# Patient Record
Sex: MALE | Race: WHITE | NOT HISPANIC OR LATINO | Employment: OTHER | URBAN - METROPOLITAN AREA
[De-identification: names, ages, dates, MRNs, and addresses within clinical notes are randomized per-mention and may not be internally consistent; named-entity substitution may affect disease eponyms.]

---

## 2021-03-03 ENCOUNTER — IMMUNIZATION (OUTPATIENT)
Dept: VACCINE CLINIC | Facility: HOSPITAL | Age: 66
End: 2021-03-03

## 2021-03-03 PROCEDURE — 0001A: CPT | Performed by: INTERNAL MEDICINE

## 2021-03-03 PROCEDURE — 91300 HC SARSCOV02 VAC 30MCG/0.3ML IM: CPT | Performed by: INTERNAL MEDICINE

## 2021-03-24 ENCOUNTER — IMMUNIZATION (OUTPATIENT)
Dept: VACCINE CLINIC | Facility: HOSPITAL | Age: 66
End: 2021-03-24

## 2021-03-24 PROCEDURE — 91300 HC SARSCOV02 VAC 30MCG/0.3ML IM: CPT | Performed by: INTERNAL MEDICINE

## 2021-03-24 PROCEDURE — 0002A: CPT | Performed by: INTERNAL MEDICINE

## 2021-07-09 ENCOUNTER — CONSULT (OUTPATIENT)
Dept: ONCOLOGY | Facility: CLINIC | Age: 66
End: 2021-07-09

## 2021-07-09 ENCOUNTER — LAB (OUTPATIENT)
Dept: LAB | Facility: HOSPITAL | Age: 66
End: 2021-07-09

## 2021-07-09 ENCOUNTER — TELEPHONE (OUTPATIENT)
Dept: ONCOLOGY | Facility: CLINIC | Age: 66
End: 2021-07-09

## 2021-07-09 VITALS
HEART RATE: 104 BPM | RESPIRATION RATE: 16 BRPM | HEIGHT: 70 IN | DIASTOLIC BLOOD PRESSURE: 77 MMHG | WEIGHT: 206.1 LBS | BODY MASS INDEX: 29.51 KG/M2 | TEMPERATURE: 97.3 F | OXYGEN SATURATION: 97 % | SYSTOLIC BLOOD PRESSURE: 125 MMHG

## 2021-07-09 DIAGNOSIS — D70.8 OTHER NEUTROPENIA (HCC): ICD-10-CM

## 2021-07-09 DIAGNOSIS — D70.8 OTHER NEUTROPENIA (HCC): Primary | ICD-10-CM

## 2021-07-09 PROBLEM — D70.9 NEUTROPENIA: Status: ACTIVE | Noted: 2021-07-09

## 2021-07-09 LAB
25(OH)D3 SERPL-MCNC: 19.5 NG/ML (ref 30–100)
ALBUMIN SERPL-MCNC: 4.2 G/DL (ref 3.5–5.2)
ALBUMIN/GLOB SERPL: 1.6 G/DL
ALP SERPL-CCNC: 69 U/L (ref 39–117)
ALT SERPL W P-5'-P-CCNC: 15 U/L (ref 1–41)
ANION GAP SERPL CALCULATED.3IONS-SCNC: 13 MMOL/L (ref 5–15)
AST SERPL-CCNC: 20 U/L (ref 1–40)
BASOPHILS # BLD AUTO: 0.01 10*3/MM3 (ref 0–0.2)
BASOPHILS NFR BLD AUTO: 0.8 % (ref 0–1.5)
BILIRUB SERPL-MCNC: 1.3 MG/DL (ref 0–1.2)
BUN SERPL-MCNC: 13 MG/DL (ref 8–23)
BUN/CREAT SERPL: 14.4 (ref 7–25)
CALCIUM SPEC-SCNC: 9.3 MG/DL (ref 8.6–10.5)
CHLORIDE SERPL-SCNC: 105 MMOL/L (ref 98–107)
CHROMATIN AB SERPL-ACNC: <10 IU/ML (ref 0–14)
CO2 SERPL-SCNC: 23 MMOL/L (ref 22–29)
CREAT SERPL-MCNC: 0.9 MG/DL (ref 0.76–1.27)
CYTOLOGIST CVX/VAG CYTO: NORMAL
DEPRECATED RDW RBC AUTO: 44.5 FL (ref 37–54)
EOSINOPHIL # BLD AUTO: 0.04 10*3/MM3 (ref 0–0.4)
EOSINOPHIL NFR BLD AUTO: 3.2 % (ref 0.3–6.2)
ERYTHROCYTE [DISTWIDTH] IN BLOOD BY AUTOMATED COUNT: 13.3 % (ref 12.3–15.4)
FOLATE SERPL-MCNC: >20 NG/ML (ref 4.78–24.2)
GFR SERPL CREATININE-BSD FRML MDRD: 84 ML/MIN/1.73
GLOBULIN UR ELPH-MCNC: 2.6 GM/DL
GLUCOSE SERPL-MCNC: 125 MG/DL (ref 65–99)
HCT VFR BLD AUTO: 42.3 % (ref 37.5–51)
HGB BLD-MCNC: 14.4 G/DL (ref 13–17.7)
HIV1+2 AB SER QL: NORMAL
IMM GRANULOCYTES # BLD AUTO: 0.01 10*3/MM3 (ref 0–0.05)
IMM GRANULOCYTES NFR BLD AUTO: 0.8 % (ref 0–0.5)
LDH SERPL-CCNC: 213 U/L (ref 135–225)
LYMPHOCYTES # BLD AUTO: 0.39 10*3/MM3 (ref 0.7–3.1)
LYMPHOCYTES NFR BLD AUTO: 31 % (ref 19.6–45.3)
MCH RBC QN AUTO: 31 PG (ref 26.6–33)
MCHC RBC AUTO-ENTMCNC: 34 G/DL (ref 31.5–35.7)
MCV RBC AUTO: 91.2 FL (ref 79–97)
MONOCYTES # BLD AUTO: 0.05 10*3/MM3 (ref 0.1–0.9)
MONOCYTES NFR BLD AUTO: 4 % (ref 5–12)
NEUTROPHILS NFR BLD AUTO: 0.76 10*3/MM3 (ref 1.7–7)
NEUTROPHILS NFR BLD AUTO: 60.2 % (ref 42.7–76)
NRBC BLD AUTO-RTO: 0 /100 WBC (ref 0–0.2)
PATH INTERP BLD-IMP: NORMAL
PLATELET # BLD AUTO: 246 10*3/MM3 (ref 140–450)
PMV BLD AUTO: 10.2 FL (ref 6–12)
POTASSIUM SERPL-SCNC: 4 MMOL/L (ref 3.5–5.2)
PROT SERPL-MCNC: 6.8 G/DL (ref 6–8.5)
RBC # BLD AUTO: 4.64 10*6/MM3 (ref 4.14–5.8)
SODIUM SERPL-SCNC: 141 MMOL/L (ref 136–145)
VIT B12 BLD-MCNC: 474 PG/ML (ref 211–946)
WBC # BLD AUTO: 1.26 10*3/MM3 (ref 3.4–10.8)

## 2021-07-09 PROCEDURE — 86038 ANTINUCLEAR ANTIBODIES: CPT

## 2021-07-09 PROCEDURE — 82306 VITAMIN D 25 HYDROXY: CPT

## 2021-07-09 PROCEDURE — 85025 COMPLETE CBC W/AUTO DIFF WBC: CPT

## 2021-07-09 PROCEDURE — 82607 VITAMIN B-12: CPT

## 2021-07-09 PROCEDURE — 84630 ASSAY OF ZINC: CPT

## 2021-07-09 PROCEDURE — 83615 LACTATE (LD) (LDH) ENZYME: CPT

## 2021-07-09 PROCEDURE — G0432 EIA HIV-1/HIV-2 SCREEN: HCPCS

## 2021-07-09 PROCEDURE — 82525 ASSAY OF COPPER: CPT

## 2021-07-09 PROCEDURE — 85060 BLOOD SMEAR INTERPRETATION: CPT

## 2021-07-09 PROCEDURE — 82746 ASSAY OF FOLIC ACID SERUM: CPT

## 2021-07-09 PROCEDURE — 86431 RHEUMATOID FACTOR QUANT: CPT

## 2021-07-09 PROCEDURE — 36415 COLL VENOUS BLD VENIPUNCTURE: CPT

## 2021-07-09 PROCEDURE — 99204 OFFICE O/P NEW MOD 45 MIN: CPT | Performed by: INTERNAL MEDICINE

## 2021-07-09 PROCEDURE — 80053 COMPREHEN METABOLIC PANEL: CPT

## 2021-07-09 RX ORDER — ATORVASTATIN CALCIUM 10 MG/1
TABLET, FILM COATED ORAL
COMMUNITY
Start: 2021-07-06 | End: 2022-05-17

## 2021-07-09 NOTE — TELEPHONE ENCOUNTER
Critical Test Results      MD: Dr. Alcantara  Date: 7/9/2021     Critical test result: WBC 1.26    Time results received: 0917

## 2021-07-09 NOTE — TELEPHONE ENCOUNTER
Name of Physician notified: Quinton Hightower Physician Notified: 10:02      []  Orders received    []  Protocol/Standing orders followed    [x]  No new orders

## 2021-07-09 NOTE — PROGRESS NOTES
New Patient Office Visit      Date: 2021     Patient Name: Jose Wells  MRN: [id number]  : 1955  Referring Physician: Kiya Grossman    Chief Complaint: Establish care for neutropenia    History of Present Illness: Jose Wells is a pleasant 65 y.o. male past medical history of hyperlipidemia and hypertension who presents today for evaluation of neutropenia. The patient is accompanied by their wife who contributes to the history of their care.  Patient states that over the past couple weeks he has been feeling more fatigued.  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  Denies any unexplained fevers, infections, night sweats, weight loss.  He was seen by his PCP who checked a CBC which was notable for WBC of 1.1 with an ANC of 0.6.  Hemoglobin and platelet count were within normal limits.  He denies any family history of any leukemias.  Does note that him and his wife recently traveled to [employer].  States that during the trip he was taking excessive dose of a vitamin C multivitamin.  Has continued to take a multivitamin since being home.  Per chart review he had a WBC of 4.8 in 2019 with an ANC of 3400 at that time.  He otherwise feels well today and is compliant with his home medications    Oncology History:    Oncology/Hematology History    No history exists.       Subjective      Review of Systems:     Constitutional: Negative for fevers, chills, or weight loss  Eyes: Negative for blurred vision or discharge         Ear/Nose/Throat: Negative for difficulty swallowing, sore throat, LAD                                                       Respiratory: Negative for cough, SOA, wheezing                                                                                        Cardiovascular: Negative for chest pain or palpitations                                                                  Gastrointestinal:  Negative for nausea, vomiting or diarrhea                                                                     Genitourinary: Negative for dysuria or hematuria                                                                                           Musculoskeletal: Negative for any joint pains or muscle aches                                                                        Neurologic: Negative for any weakness, headaches, dizziness                                                                         Hematologic: Negative for any easy bleeding or bruising                                                                                   Psychiatric: Negative for anxiety or depression                             Past Medical History:   Past Medical History:   Diagnosis Date   • Hypertension    • Skin cancer        Past Surgical History:   Past Surgical History:   Procedure Laterality Date   • CHOLECYSTECTOMY     • HAND SURGERY Right    • KNEE SURGERY Right    • KNEE SURGERY Left        Family History:   Family History   Problem Relation Age of Onset   • Alzheimer's disease Mother    • Heart disease Father    • Stroke Brother        Social History:   Social History     Socioeconomic History   • Marital status:      Spouse name: Not on file   • Number of children: Not on file   • Years of education: Not on file   • Highest education level: Not on file   Tobacco Use   • Smoking status: Former Smoker     Packs/day: 0.50     Years: 20.00     Pack years: 10.00     Types: Cigarettes     Quit date:      Years since quittin.5   • Smokeless tobacco: Never Used   • Tobacco comment: off and on for 20 years   Substance and Sexual Activity   • Alcohol use: Yes     Comment: 2-4 drinks per week   • Drug use: Never       Medications:     Current Outpatient Medications:   •  atorvastatin (LIPITOR) 10 MG tablet, , Disp: , Rfl:     Allergies:   No Known Allergies    Objective     Physical Exam:  Vital  "Signs:   Vitals:    07/09/21 0830   BP: 125/77   Pulse: 104   Resp: 16   Temp: 97.3 °F (36.3 °C)   TempSrc: Temporal   SpO2: 97%   Weight: 93.5 kg (206 lb 1.6 oz)   Height: 177.8 cm (70\")   PainSc: 0-No pain     Pain Score    07/09/21 0830   PainSc: 0-No pain     ECOG Performance Status: 0 - Asymptomatic    Constitutional: NAD, ECOG 0  Eyes: PERRLA, scleral anicteric  ENT: No LAD, no thyromegaly  Respiratory: CTAB, no wheezing, rales, rhonchi  Cardiovascular: RRR, no murmurs, pulses 2+ bilaterally  Abdomen: soft, NT/ND, no HSM  Musculoskeletal: strength 5/5 bilaterally, no c/c/e  Neurologic: A&O x 3, CN II-XII intact grossly  Psych: mood and affect congruent, no SI or HI    Results Review:   No visits with results within 2 Week(s) from this visit.   Latest known visit with results is:   No results found for any previous visit.       No results found.    Assessment / Plan      Assessment/Plan:   1. Other neutropenia (CMS/HCC) (Primary)  -Unclear etiology at this time  -Most recent WBC 1.1 with an ANC of 600.  Per chart review is WBC was 4.8 in April 2019 with an ANC of 3400  -Platelet count and Hgb within normal limits  -As his only constitutional symptom is fatigue, will hold off on bone marrow biopsy at this time unless no other secondary causes found  -Advised patient to notify the clinic for any unexplained fevers  -     CBC & Differential; Future  -     Comprehensive Metabolic Panel; Future  -     HIV-1 / O / 2 Ag / Antibody 4th Generation; Future  -     Lactate Dehydrogenase; Future  -     Folate; Future  -     Vitamin B12; Future  -     Peripheral Blood Smear; Future  -     Flow Cytometry, Blood; Future  -     Copper, Serum; Future  -     Zinc; Future  -     Nuclear Antigen Antibody, IFA; Future  -     Rheumatoid Factor; Future  -     Vitamin D 25 Hydroxy; Future           Follow Up:   Follow-up in 2 weeks     Judah Alcantara MD  Hematology and Oncology     Please note that portions of this note may have " been completed with a voice recognition program. Efforts were made to edit the dictations, but occasionally words are mistranscribed.

## 2021-07-12 LAB
ANA TITR SER IF: NEGATIVE {TITER}
REF LAB TEST METHOD: NORMAL

## 2021-07-13 LAB
COPPER SERPL-MCNC: 115 UG/DL (ref 69–132)
ZINC SERPL-MCNC: 75 UG/DL (ref 44–115)

## 2021-07-19 ENCOUNTER — OFFICE VISIT (OUTPATIENT)
Dept: ONCOLOGY | Facility: CLINIC | Age: 66
End: 2021-07-19

## 2021-07-19 VITALS
WEIGHT: 208.2 LBS | SYSTOLIC BLOOD PRESSURE: 131 MMHG | RESPIRATION RATE: 16 BRPM | OXYGEN SATURATION: 97 % | DIASTOLIC BLOOD PRESSURE: 84 MMHG | HEART RATE: 91 BPM | HEIGHT: 70 IN | BODY MASS INDEX: 29.81 KG/M2 | TEMPERATURE: 97.7 F

## 2021-07-19 DIAGNOSIS — D70.8 OTHER NEUTROPENIA (HCC): Primary | ICD-10-CM

## 2021-07-19 PROCEDURE — 99214 OFFICE O/P EST MOD 30 MIN: CPT | Performed by: INTERNAL MEDICINE

## 2021-07-19 RX ORDER — ERGOCALCIFEROL 1.25 MG/1
50000 CAPSULE ORAL
Qty: 5 CAPSULE | Refills: 4 | Status: SHIPPED | OUTPATIENT
Start: 2021-07-19 | End: 2021-12-16

## 2021-07-19 NOTE — PROGRESS NOTES
Follow Up Office Visit      Date: 2021     Patient Name: Jose Wells  MRN: [id number]  : 1955  Referring Physician: Kiya Grossman     Chief Complaint:  Follow-up for neutropenia     History of Present Illness: Jose Wells is a pleasant 65 y.o. male past medical history of hyperlipidemia and hypertension who presents today for evaluation of neutropenia. The patient is accompanied by their wife who contributes to the history of their care.  Patient states that over the past couple weeks he has been feeling more fatigued.  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  Denies any unexplained fevers, infections, night sweats, weight loss.  He was seen by his PCP who checked a CBC which was notable for WBC of 1.1 with an ANC of 0.6.  Hemoglobin and platelet count were within normal limits.  He denies any family history of any leukemias.  Does note that him and his wife recently traveled to [place].  States that during the trip he was taking excessive dose of a vitamin C multivitamin.  Has continued to take a multivitamin since being home.  Per chart review he had a WBC of 4.8 in 2019 with an ANC of 3400 at that time.  He otherwise feels well today and is compliant with his home medications    Interval History:  Presents to clinic for follow-up.  Overall doing well, but has noticed a dry cough.  Denies any fevers or sputum production.  Continues to have mild fatigue but this is overall stable    Oncology History:    Oncology/Hematology History    No history exists.       Subjective      Review of Systems:   Constitutional: Negative for fevers, chills, or weight loss  Eyes: Negative for blurred vision or discharge         Ear/Nose/Throat: Negative for difficulty swallowing, sore throat, LAD                                                       Respiratory: Negative for cough, SOA, wheezing                                    "                                                     Cardiovascular: Negative for chest pain or palpitations                                                                  Gastrointestinal: Negative for nausea, vomiting or diarrhea                                                                     Genitourinary: Negative for dysuria or hematuria                                                                                           Musculoskeletal: Negative for any joint pains or muscle aches                                                                        Neurologic: Negative for any weakness, headaches, dizziness                                                                         Hematologic: Negative for any easy bleeding or bruising                                                                                   Psychiatric: Negative for anxiety or depression                          Past Medical History/Past Surgical History/ Family History/ Social History: Reviewed by me and unchanged from my previous documentation done on July 2021.     Medications:     Current Outpatient Medications:   •  atorvastatin (LIPITOR) 10 MG tablet, , Disp: , Rfl:   •  Cyanocobalamin (VITAMIN B 12 PO), Take  by mouth., Disp: , Rfl:   •  vitamin D (ERGOCALCIFEROL) 1.25 MG (12277 UT) capsule capsule, Take 1 capsule by mouth Every 7 (Seven) Days., Disp: 5 capsule, Rfl: 4    Allergies:   No Known Allergies    Objective     Physical Exam:  Vital Signs:   Vitals:    07/19/21 0900   BP: 131/84   Pulse: 91   Resp: 16   Temp: 97.7 °F (36.5 °C)   TempSrc: Temporal   SpO2: 97%   Weight: 94.4 kg (208 lb 3.2 oz)   Height: 177.8 cm (70\")   PainSc: 0-No pain     Pain Score    07/19/21 0900   PainSc: 0-No pain     ECOG Performance Status: 0 - Asymptomatic    Constitutional: NAD, ECOG 0  Eyes: PERRLA, scleral anicteric  ENT: No LAD, no thyromegaly  Respiratory: CTAB, no wheezing, rales, rhonchi  Cardiovascular: RRR, no murmurs, " pulses 2+ bilaterally  Abdomen: soft, NT/ND, no HSM  Musculoskeletal: strength 5/5 bilaterally, no c/c/e  Neurologic: A&O x 3, CN II-XII intact grossly    Results Review:   Lab on 07/09/2021   Component Date Value Ref Range Status   • Glucose 07/09/2021 125* 65 - 99 mg/dL Final   • BUN 07/09/2021 13  8 - 23 mg/dL Final   • Creatinine 07/09/2021 0.90  0.76 - 1.27 mg/dL Final   • Sodium 07/09/2021 141  136 - 145 mmol/L Final   • Potassium 07/09/2021 4.0  3.5 - 5.2 mmol/L Final   • Chloride 07/09/2021 105  98 - 107 mmol/L Final   • CO2 07/09/2021 23.0  22.0 - 29.0 mmol/L Final   • Calcium 07/09/2021 9.3  8.6 - 10.5 mg/dL Final   • Total Protein 07/09/2021 6.8  6.0 - 8.5 g/dL Final   • Albumin 07/09/2021 4.20  3.50 - 5.20 g/dL Final   • ALT (SGPT) 07/09/2021 15  1 - 41 U/L Final   • AST (SGOT) 07/09/2021 20  1 - 40 U/L Final   • Alkaline Phosphatase 07/09/2021 69  39 - 117 U/L Final   • Total Bilirubin 07/09/2021 1.3* 0.0 - 1.2 mg/dL Final   • eGFR Non African Amer 07/09/2021 84  >60 mL/min/1.73 Final   • Globulin 07/09/2021 2.6  gm/dL Final   • A/G Ratio 07/09/2021 1.6  g/dL Final   • BUN/Creatinine Ratio 07/09/2021 14.4  7.0 - 25.0 Final   • Anion Gap 07/09/2021 13.0  5.0 - 15.0 mmol/L Final   • HIV-1/ HIV-2 07/09/2021 Non-Reactive  Non-Reactive Final   • LDH 07/09/2021 213  135 - 225 U/L Final   • Folate 07/09/2021 >20.00  4.78 - 24.20 ng/mL Final   • Vitamin B-12 07/09/2021 474  211 - 946 pg/mL Final   • Performed by: 07/09/2021 Dr. Jeb Hernandez M.D.   Final   • Pathologist Interpretation 07/09/2021 Leukopenia.  No atypical or immature forms seen.   Final   • Reference Lab Report 07/09/2021    Final    See scanned report     • Copper 07/09/2021 115  69 - 132 ug/dL Final                                    Detection Limit = 5   • Zinc 07/09/2021 75  44 - 115 ug/dL Final                                    Detection Limit = 5   • MISTY 07/09/2021 Negative   Final                                         Negative   <1:80                                        Borderline  1:80                                       Positive   >1:80   • Rheumatoid Factor Quantitative 07/09/2021 <10.0  0.0 - 14.0 IU/mL Final   • 25 Hydroxy, Vitamin D 07/09/2021 19.5* 30.0 - 100.0 ng/ml Final   • WBC 07/09/2021 1.26* 3.40 - 10.80 10*3/mm3 Final   • RBC 07/09/2021 4.64  4.14 - 5.80 10*6/mm3 Final   • Hemoglobin 07/09/2021 14.4  13.0 - 17.7 g/dL Final   • Hematocrit 07/09/2021 42.3  37.5 - 51.0 % Final   • MCV 07/09/2021 91.2  79.0 - 97.0 fL Final   • MCH 07/09/2021 31.0  26.6 - 33.0 pg Final   • MCHC 07/09/2021 34.0  31.5 - 35.7 g/dL Final   • RDW 07/09/2021 13.3  12.3 - 15.4 % Final   • RDW-SD 07/09/2021 44.5  37.0 - 54.0 fl Final   • MPV 07/09/2021 10.2  6.0 - 12.0 fL Final   • Platelets 07/09/2021 246  140 - 450 10*3/mm3 Final   • Neutrophil % 07/09/2021 60.2  42.7 - 76.0 % Final   • Lymphocyte % 07/09/2021 31.0  19.6 - 45.3 % Final   • Monocyte % 07/09/2021 4.0* 5.0 - 12.0 % Final   • Eosinophil % 07/09/2021 3.2  0.3 - 6.2 % Final   • Basophil % 07/09/2021 0.8  0.0 - 1.5 % Final   • Immature Grans % 07/09/2021 0.8* 0.0 - 0.5 % Final   • Neutrophils, Absolute 07/09/2021 0.76* 1.70 - 7.00 10*3/mm3 Final   • Lymphocytes, Absolute 07/09/2021 0.39* 0.70 - 3.10 10*3/mm3 Final   • Monocytes, Absolute 07/09/2021 0.05* 0.10 - 0.90 10*3/mm3 Final   • Eosinophils, Absolute 07/09/2021 0.04  0.00 - 0.40 10*3/mm3 Final   • Basophils, Absolute 07/09/2021 0.01  0.00 - 0.20 10*3/mm3 Final   • Immature Grans, Absolute 07/09/2021 0.01  0.00 - 0.05 10*3/mm3 Final   • nRBC 07/09/2021 0.0  0.0 - 0.2 /100 WBC Final       No results found.    Assessment / Plan      Assessment/Plan:   1. Other neutropenia (CMS/HCC) (Primary)  -Unclear etiology at this time  -Initial WBC 1.1 with an ANC of 600.  Per chart review is WBC was 4.8 in April 2019 with an ANC of 3400  -WBC 1.26 in July 2021.  ANC 0.76  -Platelet count and Hgb within normal limits  -LDH within normal limits, peripheral  smear showing no immature cells or blast  -Vitamin B12, folate, copper, zinc levels within normal limits  -Vitamin D low-have ordered replacement today  -Given his persistent neutropenia with no overt secondary cause, will order a bone marrow biopsy today to rule in/out MDS    2.  Vitamin D deficiency  -Noted to be low on lab check in July 2021  -Replacement ordered today    Follow Up:   Follow-up 1 week after bone marrow biopsy     Judah Alcantara MD  Hematology and Oncology     Please note that portions of this note may have been completed with a voice recognition program. Efforts were made to edit the dictations, but occasionally words are mistranscribed.

## 2021-07-20 ENCOUNTER — TELEPHONE (OUTPATIENT)
Dept: ONCOLOGY | Facility: CLINIC | Age: 66
End: 2021-07-20

## 2021-07-20 NOTE — TELEPHONE ENCOUNTER
Advised patient they are scheduling out a little ways. Advised he will hear from scheduling when they are able to get the BMB scheduled. Patient verbalized understanding.

## 2021-07-20 NOTE — TELEPHONE ENCOUNTER
Caller: WILL    Relationship to patient: Self    Best call back number: [phone]    Chief complaint: HOPING YOU CAN HELP GET HIS BIOPSY DATE SET UP AS HE IS TRYING TO ATTEND A  BUT DOES NOT WANT IT TO DELAY HIM GETTING HIS BIOPSY.  I STATED TO HIM THAT ALL I COULD DO IS REACH OUT TO 'S NURSE & ASK IF SHE COULD EXPEDITE IT  & UPDATE THE REF. NOTES, WHICH I DID.    Type of visit: BIOPSY

## 2021-07-22 NOTE — TELEPHONE ENCOUNTER
Advised patient we have him on the schedule for 8 am Monday morning. Patient verbalized understanding.

## 2021-07-22 NOTE — TELEPHONE ENCOUNTER
Caller: BHAVANI    Relationship: Self    Best call back number: [phone]    What was the call regarding: BHAVANI WAS CALLING FOR AN UPDATE ON WHEN HIS BIOPSY WILL BE SCHEDULED. HE SAYS HE HAS NOT HEARD ANYTHING BACK AND IS CONCERNED. HE WOULD LIKE A CALL BACK TO GO OVER THAT.    Do you require a callback: YES

## 2021-07-26 ENCOUNTER — PROCEDURE VISIT (OUTPATIENT)
Dept: ONCOLOGY | Facility: CLINIC | Age: 66
End: 2021-07-26

## 2021-07-26 ENCOUNTER — TELEPHONE (OUTPATIENT)
Dept: ONCOLOGY | Facility: CLINIC | Age: 66
End: 2021-07-26

## 2021-07-26 ENCOUNTER — HOSPITAL ENCOUNTER (OUTPATIENT)
Dept: ONCOLOGY | Facility: HOSPITAL | Age: 66
Discharge: HOME | End: 2021-07-26
Admitting: INTERNAL MEDICINE

## 2021-07-26 VITALS
RESPIRATION RATE: 16 BRPM | HEART RATE: 82 BPM | TEMPERATURE: 97.6 F | WEIGHT: 202 LBS | BODY MASS INDEX: 28.98 KG/M2 | OXYGEN SATURATION: 96 % | DIASTOLIC BLOOD PRESSURE: 88 MMHG | SYSTOLIC BLOOD PRESSURE: 143 MMHG

## 2021-07-26 DIAGNOSIS — D70.8 OTHER NEUTROPENIA (HCC): Primary | ICD-10-CM

## 2021-07-26 DIAGNOSIS — D70.8 OTHER NEUTROPENIA (HCC): ICD-10-CM

## 2021-07-26 LAB
BASOPHILS # BLD AUTO: 0.01 10*3/MM3 (ref 0–0.2)
BASOPHILS NFR BLD AUTO: 1 % (ref 0–1.5)
DEPRECATED RDW RBC AUTO: 43.8 FL (ref 37–54)
EOSINOPHIL # BLD AUTO: 0.03 10*3/MM3 (ref 0–0.4)
EOSINOPHIL NFR BLD AUTO: 3 % (ref 0.3–6.2)
ERYTHROCYTE [DISTWIDTH] IN BLOOD BY AUTOMATED COUNT: 13.4 % (ref 12.3–15.4)
HCT VFR BLD AUTO: 40.5 % (ref 37.5–51)
HGB BLD-MCNC: 14 G/DL (ref 13–17.7)
IMM GRANULOCYTES # BLD AUTO: 0 10*3/MM3 (ref 0–0.05)
IMM GRANULOCYTES NFR BLD AUTO: 0 % (ref 0–0.5)
LYMPHOCYTES # BLD AUTO: 0.31 10*3/MM3 (ref 0.7–3.1)
LYMPHOCYTES NFR BLD AUTO: 31 % (ref 19.6–45.3)
MCH RBC QN AUTO: 31.3 PG (ref 26.6–33)
MCHC RBC AUTO-ENTMCNC: 34.6 G/DL (ref 31.5–35.7)
MCV RBC AUTO: 90.4 FL (ref 79–97)
MONOCYTES # BLD AUTO: 0.05 10*3/MM3 (ref 0.1–0.9)
MONOCYTES NFR BLD AUTO: 5 % (ref 5–12)
NEUTROPHILS NFR BLD AUTO: 0.6 10*3/MM3 (ref 1.7–7)
NEUTROPHILS NFR BLD AUTO: 60 % (ref 42.7–76)
NRBC BLD AUTO-RTO: 0 /100 WBC (ref 0–0.2)
PLATELET # BLD AUTO: 232 10*3/MM3 (ref 140–450)
PMV BLD AUTO: 10.6 FL (ref 6–12)
RBC # BLD AUTO: 4.48 10*6/MM3 (ref 4.14–5.8)
WBC # BLD AUTO: 1 10*3/MM3 (ref 3.4–10.8)

## 2021-07-26 PROCEDURE — 99212-NC PR NO CHARGE CBC OFFICE OUTPATIENT VISIT 10 MINUTES: Performed by: INTERNAL MEDICINE

## 2021-07-26 PROCEDURE — 38221 DX BONE MARROW BIOPSIES: CPT

## 2021-07-26 PROCEDURE — 88311 DECALCIFY TISSUE: CPT | Performed by: INTERNAL MEDICINE

## 2021-07-26 PROCEDURE — 25010000002 HYDROMORPHONE PER 4 MG: Performed by: INTERNAL MEDICINE

## 2021-07-26 PROCEDURE — 96376 TX/PRO/DX INJ SAME DRUG ADON: CPT

## 2021-07-26 PROCEDURE — 88313 SPECIAL STAINS GROUP 2: CPT | Performed by: INTERNAL MEDICINE

## 2021-07-26 PROCEDURE — 85025 COMPLETE CBC W/AUTO DIFF WBC: CPT | Performed by: INTERNAL MEDICINE

## 2021-07-26 PROCEDURE — 88342 IMHCHEM/IMCYTCHM 1ST ANTB: CPT | Performed by: INTERNAL MEDICINE

## 2021-07-26 PROCEDURE — 38222 DX BONE MARROW BX & ASPIR: CPT | Performed by: INTERNAL MEDICINE

## 2021-07-26 PROCEDURE — 25010000002 LORAZEPAM PER 2 MG: Performed by: INTERNAL MEDICINE

## 2021-07-26 PROCEDURE — 88305 TISSUE EXAM BY PATHOLOGIST: CPT | Performed by: INTERNAL MEDICINE

## 2021-07-26 PROCEDURE — 88341 IMHCHEM/IMCYTCHM EA ADD ANTB: CPT | Performed by: INTERNAL MEDICINE

## 2021-07-26 PROCEDURE — 85097 BONE MARROW INTERPRETATION: CPT | Performed by: INTERNAL MEDICINE

## 2021-07-26 PROCEDURE — 96375 TX/PRO/DX INJ NEW DRUG ADDON: CPT

## 2021-07-26 RX ORDER — HYDROMORPHONE HCL 110MG/55ML
0.5 PATIENT CONTROLLED ANALGESIA SYRINGE INTRAVENOUS ONCE
Status: COMPLETED | OUTPATIENT
Start: 2021-07-26 | End: 2021-07-26

## 2021-07-26 RX ORDER — LORAZEPAM 2 MG/ML
0.5 INJECTION INTRAMUSCULAR ONCE
Status: COMPLETED | OUTPATIENT
Start: 2021-07-26 | End: 2021-07-26

## 2021-07-26 RX ADMIN — HYDROMORPHONE HYDROCHLORIDE 0.5 MG: 2 INJECTION, SOLUTION INTRAMUSCULAR; INTRAVENOUS; SUBCUTANEOUS at 08:45

## 2021-07-26 RX ADMIN — LORAZEPAM 0.5 MG: 2 INJECTION INTRAMUSCULAR; INTRAVENOUS at 08:43

## 2021-07-26 NOTE — TELEPHONE ENCOUNTER
----- Message from Heide Gr RN sent at 7/26/2021 10:38 AM EDT -----      Critical Test Results      MD:  Quinton    Date: 7/26/2021     Critical test result:  WBC 1.0    Time results received:  10:38

## 2021-07-26 NOTE — PROGRESS NOTES
============    BONE MARROW PROCEDURE      =========        INDICATION: Neutropenia    After obtaining informed consent the skin overlying the left posterior iliac crest  was sterilely prepped and locally anesthetized.  Biopsy needle was entered into the left posterior iliac crest.  A 1.5 cm bone marrow biopsy and 20 mL aspirate were successfully obtained without complication. Adequate hemostasis was observed throughout. An aliquot is sent for flow cytometry or additional testing as ordered. Patient tolerated procedure well.     Judah Alcantara MD  7/26/2021

## 2021-07-26 NOTE — TELEPHONE ENCOUNTER
Name of Physician notified: Quinton Hightower Physician Notified: 11:33      []  Orders received    []  Protocol/Standing orders followed    [x]  No new orders

## 2021-08-03 ENCOUNTER — OFFICE VISIT (OUTPATIENT)
Dept: ONCOLOGY | Facility: CLINIC | Age: 66
End: 2021-08-03

## 2021-08-03 VITALS
TEMPERATURE: 97.3 F | RESPIRATION RATE: 16 BRPM | HEIGHT: 70 IN | HEART RATE: 89 BPM | DIASTOLIC BLOOD PRESSURE: 92 MMHG | SYSTOLIC BLOOD PRESSURE: 128 MMHG | BODY MASS INDEX: 29.54 KG/M2 | OXYGEN SATURATION: 97 % | WEIGHT: 206.3 LBS

## 2021-08-03 DIAGNOSIS — D70.8 OTHER NEUTROPENIA (HCC): Primary | ICD-10-CM

## 2021-08-03 PROCEDURE — 99214 OFFICE O/P EST MOD 30 MIN: CPT | Performed by: INTERNAL MEDICINE

## 2021-08-03 NOTE — PROGRESS NOTES
Follow Up Office Visit      Date: 2021     Patient Name: Jose Wells  MRN: [id number]  : 1955  Referring Physician: Kiya Grossman     Chief Complaint:  Follow-up for neutropenia     History of Present Illness: Jose Wells is a pleasant 65 y.o. male past medical history of hyperlipidemia and hypertension who presents today for evaluation of neutropenia. The patient is accompanied by their wife who contributes to the history of their care.  Patient states that over the past couple weeks he has been feeling more fatigued.  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  Denies any unexplained fevers, infections, night sweats, weight loss.  He was seen by his PCP who checked a CBC which was notable for WBC of 1.1 with an ANC of 0.6.  Hemoglobin and platelet count were within normal limits.  He denies any family history of any leukemias.  Does note that him and his wife recently traveled to [place].  States that during the trip he was taking excessive dose of a vitamin C multivitamin.  Has continued to take a multivitamin since being home.  Per chart review he had a WBC of 4.8 in 2019 with an ANC of 3400 at that time.  He otherwise feels well today and is compliant with his home medications     Interval History:  Presents to clinic for follow-up.  Tolerated his bone marrow biopsy well without any significant pain or discomfort.  Remains active at this time with no worsening fatigue.  Denies any unexplained fevers, chills, night sweats.  Denies any other medication changes    Oncology History:    Oncology/Hematology History    No history exists.       Subjective      Review of Systems:   Constitutional: Negative for fevers, chills, or weight loss  Eyes: Negative for blurred vision or discharge         Ear/Nose/Throat: Negative for difficulty swallowing, sore throat, LAD                                                      "  Respiratory: Negative for cough, SOA, wheezing                                                                                        Cardiovascular: Negative for chest pain or palpitations                                                                  Gastrointestinal: Negative for nausea, vomiting or diarrhea                                                                     Genitourinary: Negative for dysuria or hematuria                                                                                           Musculoskeletal: Negative for any joint pains or muscle aches                                                                        Neurologic: Negative for any weakness, headaches, dizziness                                                                         Hematologic: Negative for any easy bleeding or bruising                                                                                   Psychiatric: Negative for anxiety or depression                          Past Medical History/Past Surgical History/ Family History/ Social History: Reviewed by me and unchanged from my previous documentation done on July 2021.     Medications:     Current Outpatient Medications:   •  atorvastatin (LIPITOR) 10 MG tablet, , Disp: , Rfl:   •  Cyanocobalamin (VITAMIN B 12 PO), Take  by mouth., Disp: , Rfl:   •  vitamin D (ERGOCALCIFEROL) 1.25 MG (60986 UT) capsule capsule, Take 1 capsule by mouth Every 7 (Seven) Days., Disp: 5 capsule, Rfl: 4    Allergies:   No Known Allergies    Objective     Physical Exam:  Vital Signs:   Vitals:    08/03/21 0946   BP: 128/92   Pulse: 89   Resp: 16   Temp: 97.3 °F (36.3 °C)   TempSrc: Temporal   SpO2: 97%   Weight: 93.6 kg (206 lb 4.8 oz)   Height: 177.8 cm (70\")   PainSc: 0-No pain     Pain Score    08/03/21 0946   PainSc: 0-No pain     ECOG Performance Status: 0 - Asymptomatic    Constitutional: NAD, ECOG 0  Eyes: PERRLA, scleral anicteric  ENT: No LAD, no " thyromegaly  Respiratory: CTAB, no wheezing, rales, rhonchi  Cardiovascular: RRR, no murmurs, pulses 2+ bilaterally  Abdomen: soft, NT/ND, no HSM  Musculoskeletal: strength 5/5 bilaterally, no c/c/e  Neurologic: A&O x 3, CN II-XII intact grossly    Results Review:   Hospital Outpatient Visit on 07/26/2021   Component Date Value Ref Range Status   • Case Report 07/26/2021    Final                    Value:Surgical Pathology Report                         Case: [id number]                                  Authorizing Provider:  Judah Alcantara MD      Collected:           07/26/2021 08:40 AM          Ordering Location:     [facility]     Received:            07/26/2021 10:19 AM                                 [facility] HEMATOLOGY AND                                                                                ONCOLOGY                                                                     Pathologist:           Mony Salcido MD                                                        Specimens:   1) - Iliac Crest, Left - Aspirate                                                                   2) - Iliac Crest, Left - Biopsy                                                           • Clinical Information 07/26/2021    Final                    Value:This result contains rich text formatting which cannot be displayed here.   • Final Diagnosis 07/26/2021    Final                    Value:This result contains rich text formatting which cannot be displayed here.   • Comment 07/26/2021    Final                    Value:This result contains rich text formatting which cannot be displayed here.   • Gross Description 07/26/2021    Final                    Value:This result contains rich text formatting which cannot be displayed here.   • Microscopic Description 07/26/2021    Final                    Value:This result contains rich text formatting which cannot be displayed here.   • Flow Cytometry Summary 07/26/2021     Final                    Value:This result contains rich text formatting which cannot be displayed here.   • WBC 07/26/2021 1.00* 3.40 - 10.80 10*3/mm3 Final   • RBC 07/26/2021 4.48  4.14 - 5.80 10*6/mm3 Final   • Hemoglobin 07/26/2021 14.0  13.0 - 17.7 g/dL Final   • Hematocrit 07/26/2021 40.5  37.5 - 51.0 % Final   • MCV 07/26/2021 90.4  79.0 - 97.0 fL Final   • MCH 07/26/2021 31.3  26.6 - 33.0 pg Final   • MCHC 07/26/2021 34.6  31.5 - 35.7 g/dL Final   • RDW 07/26/2021 13.4  12.3 - 15.4 % Final   • RDW-SD 07/26/2021 43.8  37.0 - 54.0 fl Final   • MPV 07/26/2021 10.6  6.0 - 12.0 fL Final   • Platelets 07/26/2021 232  140 - 450 10*3/mm3 Final   • Neutrophil % 07/26/2021 60.0  42.7 - 76.0 % Final   • Lymphocyte % 07/26/2021 31.0  19.6 - 45.3 % Final   • Monocyte % 07/26/2021 5.0  5.0 - 12.0 % Final   • Eosinophil % 07/26/2021 3.0  0.3 - 6.2 % Final   • Basophil % 07/26/2021 1.0  0.0 - 1.5 % Final   • Immature Grans % 07/26/2021 0.0  0.0 - 0.5 % Final   • Neutrophils, Absolute 07/26/2021 0.60* 1.70 - 7.00 10*3/mm3 Final   • Lymphocytes, Absolute 07/26/2021 0.31* 0.70 - 3.10 10*3/mm3 Final   • Monocytes, Absolute 07/26/2021 0.05* 0.10 - 0.90 10*3/mm3 Final   • Eosinophils, Absolute 07/26/2021 0.03  0.00 - 0.40 10*3/mm3 Final   • Basophils, Absolute 07/26/2021 0.01  0.00 - 0.20 10*3/mm3 Final   • Immature Grans, Absolute 07/26/2021 0.00  0.00 - 0.05 10*3/mm3 Final   • nRBC 07/26/2021 0.0  0.0 - 0.2 /100 WBC Final       No results found.    Assessment / Plan      Assessment/Plan:   1. Other neutropenia (CMS/HCC) (Primary)  -Unclear etiology at this time  -Initial WBC 1.1 with an ANC of 600.  Per chart review is WBC was 4.8 in April 2019 with an ANC of 3400  -WBC 1.26 in July 2021.  ANC 0.76  -Platelet count and Hgb within normal limits  -LDH within normal limits, peripheral smear showing no immature cells or blast  -Vitamin B12, folate, copper, zinc levels within normal limits  -Vitamin D low-have ordered replacement  today  -Status post bone marrow biopsy with aspirate on 7/26/2021  -Pathology nonconcerning for an acute malignancy or MDS.  Most notable for a reactive/regenerative process  -Had further discussion with patient and he denies any tick bites or mosquito bites or any viral illnesses over the past several months  -Unclear etiology for his neutropenia at this time  -We'll repeat a CBC in 1 month and if he continues to have neutropenia, will consider Neupogen at that time     2.  Vitamin D deficiency  -Noted to be low on lab check in July 2021  -Continue vitamin D replacement    Follow Up:   Follow-up in 1 month with repeat CBC     Judah Alcantara MD  Hematology and Oncology     Please note that portions of this note may have been completed with a voice recognition program. Efforts were made to edit the dictations, but occasionally words are mistranscribed.

## 2021-08-12 LAB
CYTO UR: NORMAL
LAB AP CASE REPORT: NORMAL
LAB AP CLINICAL INFORMATION: NORMAL
LAB AP CYTOGENETICS REPORT,ADDENDUM: NORMAL
LAB AP DIAGNOSIS COMMENT: NORMAL
LAB AP FLOW CYTOMETRY SUMMARY: NORMAL
LAB AP INTEGRATED ONCOLOGY, ADDENDUM: NORMAL
PATH REPORT.FINAL DX SPEC: NORMAL
PATH REPORT.GROSS SPEC: NORMAL

## 2021-09-07 ENCOUNTER — LAB (OUTPATIENT)
Dept: LAB | Facility: HOSPITAL | Age: 66
End: 2021-09-07

## 2021-09-07 ENCOUNTER — OFFICE VISIT (OUTPATIENT)
Dept: ONCOLOGY | Facility: CLINIC | Age: 66
End: 2021-09-07

## 2021-09-07 ENCOUNTER — TELEPHONE (OUTPATIENT)
Dept: ONCOLOGY | Facility: CLINIC | Age: 66
End: 2021-09-07

## 2021-09-07 VITALS
BODY MASS INDEX: 28.77 KG/M2 | SYSTOLIC BLOOD PRESSURE: 138 MMHG | OXYGEN SATURATION: 96 % | RESPIRATION RATE: 18 BRPM | HEART RATE: 93 BPM | DIASTOLIC BLOOD PRESSURE: 86 MMHG | WEIGHT: 201 LBS | HEIGHT: 70 IN | TEMPERATURE: 97.3 F

## 2021-09-07 DIAGNOSIS — D70.8 OTHER NEUTROPENIA (HCC): Primary | ICD-10-CM

## 2021-09-07 DIAGNOSIS — D70.8 OTHER NEUTROPENIA (HCC): ICD-10-CM

## 2021-09-07 LAB
ERYTHROCYTE [DISTWIDTH] IN BLOOD BY AUTOMATED COUNT: 15.2 % (ref 12.3–15.4)
FERRITIN SERPL-MCNC: 100.2 NG/ML (ref 30–400)
HAV IGM SERPL QL IA: NORMAL
HBV CORE IGM SERPL QL IA: NORMAL
HBV SURFACE AG SERPL QL IA: NORMAL
HCT VFR BLD AUTO: 39.1 % (ref 37.5–51)
HCV AB SER DONR QL: NORMAL
HGB BLD-MCNC: 13 G/DL (ref 13–17.7)
IRON 24H UR-MRATE: 101 MCG/DL (ref 59–158)
IRON SATN MFR SERPL: 35 % (ref 20–50)
LYMPHOCYTES # BLD AUTO: 0.5 10*3/MM3 (ref 0.7–3.1)
LYMPHOCYTES NFR BLD AUTO: 36 % (ref 19.6–45.3)
MCH RBC QN AUTO: 30.9 PG (ref 26.6–33)
MCHC RBC AUTO-ENTMCNC: 33.3 G/DL (ref 31.5–35.7)
MCV RBC AUTO: 92.8 FL (ref 79–97)
MONOCYTES # BLD AUTO: 0.1 10*3/MM3 (ref 0.1–0.9)
MONOCYTES NFR BLD AUTO: 4.3 % (ref 5–12)
NEUTROPHILS NFR BLD AUTO: 0.8 10*3/MM3 (ref 1.7–7)
NEUTROPHILS NFR BLD AUTO: 59.7 % (ref 42.7–76)
PLATELET # BLD AUTO: 230 10*3/MM3 (ref 140–450)
PMV BLD AUTO: 7.1 FL (ref 6–12)
RBC # BLD AUTO: 4.21 10*6/MM3 (ref 4.14–5.8)
TIBC SERPL-MCNC: 288 MCG/DL (ref 298–536)
TRANSFERRIN SERPL-MCNC: 193 MG/DL (ref 200–360)
WBC # BLD AUTO: 1.3 10*3/MM3 (ref 3.4–10.8)

## 2021-09-07 PROCEDURE — 85025 COMPLETE CBC W/AUTO DIFF WBC: CPT

## 2021-09-07 PROCEDURE — 87799 DETECT AGENT NOS DNA QUANT: CPT

## 2021-09-07 PROCEDURE — 82728 ASSAY OF FERRITIN: CPT

## 2021-09-07 PROCEDURE — 36415 COLL VENOUS BLD VENIPUNCTURE: CPT

## 2021-09-07 PROCEDURE — 99214 OFFICE O/P EST MOD 30 MIN: CPT | Performed by: INTERNAL MEDICINE

## 2021-09-07 PROCEDURE — 83540 ASSAY OF IRON: CPT

## 2021-09-07 PROCEDURE — 80074 ACUTE HEPATITIS PANEL: CPT

## 2021-09-07 PROCEDURE — 84466 ASSAY OF TRANSFERRIN: CPT

## 2021-09-07 NOTE — PROGRESS NOTES
Follow Up Office Visit      Date: 2021     Patient Name: Jose Wells  MRN: [id number]  : 1955  Referring Physician: Kiya Grossman     Chief Complaint:  Follow-up for neutropenia     History of Present Illness: Jose Wells is a pleasant 65 y.o. male past medical history of hyperlipidemia and hypertension who presents today for evaluation of neutropenia. The patient is accompanied by their wife who contributes to the history of their care.  Patient states that over the past couple weeks he has been feeling more fatigued.  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  Denies any unexplained fevers, infections, night sweats, weight loss.  He was seen by his PCP who checked a CBC which was notable for WBC of 1.1 with an ANC of 0.6.  Hemoglobin and platelet count were within normal limits.  He denies any family history of any leukemias.  Does note that him and his wife recently traveled to [place].  States that during the trip he was taking excessive dose of a vitamin C multivitamin.  Has continued to take a multivitamin since being home.  Per chart review he had a WBC of 4.8 in 2019 with an ANC of 3400 at that time.  He otherwise feels well today and is compliant with his home medications     Interval History:  Presents to clinic for follow-up.  Overall stable at this time.  Continues to have significant fatigue but has not worsened over the past 1-2 months.  Denies any recent or recurrent infections.  Denies any unexplained night sweats, fevers, chills, weight loss    Oncology History:    Oncology/Hematology History    No history exists.       Subjective      Review of Systems:   Constitutional: Negative for fevers, chills, or weight loss  Eyes: Negative for blurred vision or discharge         Ear/Nose/Throat: Negative for difficulty swallowing, sore throat, LAD                                                      "  Respiratory: Negative for cough, SOA, wheezing                                                                                        Cardiovascular: Negative for chest pain or palpitations                                                                  Gastrointestinal: Negative for nausea, vomiting or diarrhea                                                                     Genitourinary: Negative for dysuria or hematuria                                                                                           Musculoskeletal: Negative for any joint pains or muscle aches                                                                        Neurologic: Negative for any weakness, headaches, dizziness                                                                         Hematologic: Negative for any easy bleeding or bruising                                                                                   Psychiatric: Negative for anxiety or depression                          Past Medical History/Past Surgical History/ Family History/ Social History: Reviewed by me and unchanged from my previous documentation done on August 2021.     Medications:     Current Outpatient Medications:   •  atorvastatin (LIPITOR) 10 MG tablet, , Disp: , Rfl:   •  Cyanocobalamin (VITAMIN B 12 PO), Take  by mouth., Disp: , Rfl:   •  vitamin D (ERGOCALCIFEROL) 1.25 MG (00298 UT) capsule capsule, Take 1 capsule by mouth Every 7 (Seven) Days., Disp: 5 capsule, Rfl: 4    Allergies:   No Known Allergies    Objective     Physical Exam:  Vital Signs:   Vitals:    09/07/21 1122   BP: 138/86   Pulse: 93   Resp: 18   Temp: 97.3 °F (36.3 °C)   SpO2: 96%   Weight: 91.2 kg (201 lb)   Height: 177.8 cm (70\")   PainSc: 0-No pain     Pain Score    09/07/21 1122   PainSc: 0-No pain     ECOG Performance Status: 1 - Symptomatic but completely ambulatory    Constitutional: NAD, ECOG 1  Eyes: PERRLA, scleral anicteric  ENT: No LAD, no " thyromegaly  Respiratory: CTAB, no wheezing, rales, rhonchi  Cardiovascular: RRR, no murmurs, pulses 2+ bilaterally  Abdomen: soft, NT/ND, no HSM  Musculoskeletal: strength 5/5 bilaterally, no c/c/e  Neurologic: A&O x 3, CN II-XII intact grossly    Results Review:   Lab on 09/07/2021   Component Date Value Ref Range Status   • WBC 09/07/2021 1.30* 3.40 - 10.80 10*3/mm3 Final    Verified by repeat analysis.    • RBC 09/07/2021 4.21  4.14 - 5.80 10*6/mm3 Final   • Hemoglobin 09/07/2021 13.0  13.0 - 17.7 g/dL Final   • Hematocrit 09/07/2021 39.1  37.5 - 51.0 % Final   • RDW 09/07/2021 15.2  12.3 - 15.4 % Final   • MCV 09/07/2021 92.8  79.0 - 97.0 fL Final   • MCH 09/07/2021 30.9  26.6 - 33.0 pg Final   • MCHC 09/07/2021 33.3  31.5 - 35.7 g/dL Final   • MPV 09/07/2021 7.1  6.0 - 12.0 fL Final   • Platelets 09/07/2021 230  140 - 450 10*3/mm3 Final   • Neutrophil % 09/07/2021 59.7  42.7 - 76.0 % Final   • Lymphocyte % 09/07/2021 36.0  19.6 - 45.3 % Final   • Monocyte % 09/07/2021 4.3* 5.0 - 12.0 % Final   • Neutrophils, Absolute 09/07/2021 0.80* 1.70 - 7.00 10*3/mm3 Final   • Lymphocytes, Absolute 09/07/2021 0.50* 0.70 - 3.10 10*3/mm3 Final   • Monocytes, Absolute 09/07/2021 0.10  0.10 - 0.90 10*3/mm3 Final       No results found.    Assessment / Plan      Assessment/Plan:   1. Other neutropenia (CMS/HCC) (Primary)  -Unclear etiology at this time  -Initial WBC 1.1 with an ANC of 600.  Per chart review is WBC was 4.8 in April 2019 with an ANC of 3400  -WBC 1.26 in July 2021.  ANC 0.76  -Platelet count and Hgb within normal limits  -LDH within normal limits, peripheral smear showing no immature cells or blast  -Vitamin B12, folate, copper, zinc levels within normal limits  -Vitamin D low-have ordered replacement today  -Status post bone marrow biopsy with aspirate on 7/26/2021  -Pathology nonconcerning for an acute malignancy or MDS.  Most notable for a reactive/regenerative process  -Had further discussion with patient and  he denies any tick bites or mosquito bites or any viral illnesses over the past several months  -Unclear etiology for his neutropenia at this time  -Repeat CBC showing a WBC of 1.3 with an ANC of 0.8  -Patient still asymptomatic at this time  -We will check iron studies, hepatitis panel, EBV today     2.  Vitamin D deficiency  -Noted to be low on lab check in July 2021  -Continue vitamin D replacement    3.  Vitamin B12 deficiency  -Stable on replacement     Follow Up:   Follow-up in 3 month with repeat CBC     Judah Alcantara MD  Hematology and Oncology     Please note that portions of this note may have been completed with a voice recognition program. Efforts were made to edit the dictations, but occasionally words are mistranscribed.

## 2021-09-07 NOTE — TELEPHONE ENCOUNTER
Critical Test Results      MD: Judah Alcantara    Date: 09/07/2021     Critical test result: WBC 1.3    Time results received:10:01

## 2021-09-07 NOTE — TELEPHONE ENCOUNTER
Name of Physician notified: Quinton Hightower Physician Notified:  10:25      []  Orders received    []  Protocol/Standing orders followed    [x]  No new orders

## 2021-09-10 LAB
EBV DNA # SERPL NAA+PROBE: NEGATIVE COPIES/ML
EBV DNA SPEC NAA+PROBE-LOG#: NORMAL {LOG_COPIES}/ML

## 2021-09-27 ENCOUNTER — TELEPHONE (OUTPATIENT)
Dept: ONCOLOGY | Facility: CLINIC | Age: 66
End: 2021-09-27

## 2021-09-27 NOTE — TELEPHONE ENCOUNTER
Caller: KERMIT    Relationship: PATIENT    Best call back number: [phone]     What is the best time to reach you: ANYTIME    Who are you requesting to speak with (clinical staff, provider,  specific staff member): CLINICAL STAFF    What was the call regarding: HE STATES HIS TRANSFERRIN AND TIBC WERE LOW FROM HIS 09/07 LABS. HE'D LIKE TO KNOW WHAT DR WALKER WOULD SUGGEST HE DO.       Do you require a callback: YES

## 2021-09-27 NOTE — TELEPHONE ENCOUNTER
Discussed labs with Dr. Alcantara, advised patient there is not anything we would do for this. Patient does not need to start iron at this time. Patient verbalized understanding.

## 2021-10-18 ENCOUNTER — TELEPHONE (OUTPATIENT)
Dept: ONCOLOGY | Facility: CLINIC | Age: 66
End: 2021-10-18

## 2021-10-18 NOTE — TELEPHONE ENCOUNTER
Caller: TAYLOR DAWN    Relationship: Emergency Contact    Best call back number: [phone]    What form or medical record are you requesting: LETTER STATING THAT KERMIT'S LOW BLOOD COUNT MAKES IT TO WHERE HE IS NOT ALLOWED TO FLY.    Who is requesting this form or medical record from you: [employer]    How would you like to receive the form or medical records (pick-up, mail, fax)EMAIL  EMAIL: [email or web address]    Timeframe paperwork needed: ASAP

## 2021-10-19 NOTE — TELEPHONE ENCOUNTER
Caller: TAYLOR DAWN    Relationship: Emergency Contact    Best call back number: [phone]    What was the call regarding: TAYLOR CALLED BACK TO SAY THAT THE [employer] IS NEEDING A MORE SPECIFIC DIAGNOSIS ON THAT LETTER FOR THEM.    Do you require a callback: YES

## 2021-10-20 NOTE — TELEPHONE ENCOUNTER
Called Emily to discuss what is needed in addition to the letter given previously. No answer, left message to give the office a call back to discuss.

## 2021-12-06 ENCOUNTER — TELEPHONE (OUTPATIENT)
Dept: ONCOLOGY | Facility: CLINIC | Age: 66
End: 2021-12-06

## 2021-12-06 DIAGNOSIS — D70.8 OTHER NEUTROPENIA (HCC): Primary | ICD-10-CM

## 2021-12-06 NOTE — TELEPHONE ENCOUNTER
Advised patient he can have drawn prior to office visit tomorrow. Patient verbalized understanding.

## 2021-12-06 NOTE — TELEPHONE ENCOUNTER
Caller: Jose Wells    Relationship: Self    Best call back number: [phone]    What was the call regarding: WILL CALLED TO SEE IF HE NEEDS TO HAVE LABS DONE PRIOR TO HIS APPT TOMORROW MORNING. IF SO, HE NEEDS TO KNOW WHEN HE SHOULD COME IN FOR THOSE.    Do you require a callback: YES

## 2021-12-07 ENCOUNTER — TELEPHONE (OUTPATIENT)
Dept: ONCOLOGY | Facility: CLINIC | Age: 66
End: 2021-12-07

## 2021-12-07 ENCOUNTER — LAB (OUTPATIENT)
Dept: LAB | Facility: HOSPITAL | Age: 66
End: 2021-12-07

## 2021-12-07 ENCOUNTER — OFFICE VISIT (OUTPATIENT)
Dept: ONCOLOGY | Facility: CLINIC | Age: 66
End: 2021-12-07

## 2021-12-07 VITALS
SYSTOLIC BLOOD PRESSURE: 152 MMHG | OXYGEN SATURATION: 98 % | HEART RATE: 72 BPM | RESPIRATION RATE: 16 BRPM | HEIGHT: 70 IN | DIASTOLIC BLOOD PRESSURE: 82 MMHG | BODY MASS INDEX: 28.92 KG/M2 | TEMPERATURE: 97.3 F | WEIGHT: 202 LBS

## 2021-12-07 DIAGNOSIS — D70.8 OTHER NEUTROPENIA (HCC): ICD-10-CM

## 2021-12-07 DIAGNOSIS — D70.8 OTHER NEUTROPENIA (HCC): Primary | ICD-10-CM

## 2021-12-07 LAB
BASOPHILS # BLD AUTO: 0 10*3/MM3 (ref 0–0.2)
BASOPHILS NFR BLD AUTO: 0 % (ref 0–1.5)
DEPRECATED RDW RBC AUTO: 47.9 FL (ref 37–54)
EOSINOPHIL # BLD AUTO: 0.03 10*3/MM3 (ref 0–0.4)
EOSINOPHIL NFR BLD AUTO: 3.3 % (ref 0.3–6.2)
ERYTHROCYTE [DISTWIDTH] IN BLOOD BY AUTOMATED COUNT: 14 % (ref 12.3–15.4)
HCT VFR BLD AUTO: 37.8 % (ref 37.5–51)
HGB BLD-MCNC: 12.8 G/DL (ref 13–17.7)
IMM GRANULOCYTES # BLD AUTO: 0 10*3/MM3 (ref 0–0.05)
IMM GRANULOCYTES NFR BLD AUTO: 0 % (ref 0–0.5)
LYMPHOCYTES # BLD AUTO: 0.38 10*3/MM3 (ref 0.7–3.1)
LYMPHOCYTES NFR BLD AUTO: 42.2 % (ref 19.6–45.3)
MCH RBC QN AUTO: 31.6 PG (ref 26.6–33)
MCHC RBC AUTO-ENTMCNC: 33.9 G/DL (ref 31.5–35.7)
MCV RBC AUTO: 93.3 FL (ref 79–97)
MONOCYTES # BLD AUTO: 0.06 10*3/MM3 (ref 0.1–0.9)
MONOCYTES NFR BLD AUTO: 6.7 % (ref 5–12)
NEUTROPHILS NFR BLD AUTO: 0.43 10*3/MM3 (ref 1.7–7)
NEUTROPHILS NFR BLD AUTO: 47.8 % (ref 42.7–76)
NRBC BLD AUTO-RTO: 0 /100 WBC (ref 0–0.2)
PLATELET # BLD AUTO: 195 10*3/MM3 (ref 140–450)
PMV BLD AUTO: 9.5 FL (ref 6–12)
RBC # BLD AUTO: 4.05 10*6/MM3 (ref 4.14–5.8)
WBC NRBC COR # BLD: 0.9 10*3/MM3 (ref 3.4–10.8)

## 2021-12-07 PROCEDURE — 36415 COLL VENOUS BLD VENIPUNCTURE: CPT

## 2021-12-07 PROCEDURE — 85025 COMPLETE CBC W/AUTO DIFF WBC: CPT

## 2021-12-07 PROCEDURE — 99214 OFFICE O/P EST MOD 30 MIN: CPT | Performed by: INTERNAL MEDICINE

## 2021-12-07 NOTE — TELEPHONE ENCOUNTER
Critical Test Results      MD: Quinton    Date: 12/7     Critical test result:WBC 0.9, ANC 0.43    Time results received:9:08 am

## 2021-12-07 NOTE — TELEPHONE ENCOUNTER
Name of Physician notified: Quinton Hightower Physician Notified: 9:10      [x]  Orders received - repeat BMB, orders placed.     []  Protocol/Standing orders followed    []  No new orders

## 2021-12-07 NOTE — PROGRESS NOTES
Follow Up Office Visit      Date: 2021     Patient Name: Jose Wells  MRN: [id number]  : 1955  Referring Physician: Kiya Grossman     Chief Complaint:  Follow-up for neutropenia     History of Present Illness: Jose Wells is a pleasant 65 y.o. male past medical history of hyperlipidemia and hypertension who presents today for evaluation of neutropenia. The patient is accompanied by their wife who contributes to the history of their care.  Patient states that over the past couple weeks he has been feeling more fatigued.  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  Denies any unexplained fevers, infections, night sweats, weight loss.  He was seen by his PCP who checked a CBC which was notable for WBC of 1.1 with an ANC of 0.6.  Hemoglobin and platelet count were within normal limits.  He denies any family history of any leukemias.  Does note that him and his wife recently traveled to [place].  States that during the trip he was taking excessive dose of a vitamin C multivitamin.  Has continued to take a multivitamin since being home.  Per chart review he had a WBC of 4.8 in 2019 with an ANC of 3400 at that time.  He otherwise feels well today and is compliant with his home medications     Interval History:  Presents to clinic for follow-up.  Overall stable at this time.  Fatigue stable at this time.  Denies any new or recurrent infections.  Denies any unexplained fevers, chills, night sweats    Oncology History:    Oncology/Hematology History    No history exists.       Subjective      Review of Systems:   Constitutional: Negative for fevers, chills, or weight loss  Eyes: Negative for blurred vision or discharge         Ear/Nose/Throat: Negative for difficulty swallowing, sore throat, LAD                                                       Respiratory: Negative for cough, SOA, wheezing                               "                                                          Cardiovascular: Negative for chest pain or palpitations                                                                  Gastrointestinal: Negative for nausea, vomiting or diarrhea                                                                     Genitourinary: Negative for dysuria or hematuria                                                                                           Musculoskeletal: Negative for any joint pains or muscle aches                                                                        Neurologic: Negative for any weakness, headaches, dizziness                                                                         Hematologic: Negative for any easy bleeding or bruising                                                                                   Psychiatric: Negative for anxiety or depression                          Past Medical History/Past Surgical History/ Family History/ Social History: Reviewed by me and unchanged from my previous documentation done on September 2021.     Medications:     Current Outpatient Medications:   •  atorvastatin (LIPITOR) 10 MG tablet, , Disp: , Rfl:   •  Cyanocobalamin (VITAMIN B 12 PO), Take  by mouth., Disp: , Rfl:   •  vitamin D (ERGOCALCIFEROL) 1.25 MG (17748 UT) capsule capsule, Take 1 capsule by mouth Every 7 (Seven) Days., Disp: 5 capsule, Rfl: 4    Allergies:   No Known Allergies    Objective     Physical Exam:  Vital Signs:   Vitals:    12/07/21 0832   BP: 152/82   Pulse: 72   Resp: 16   Temp: 97.3 °F (36.3 °C)   SpO2: 98%   Weight: 91.6 kg (202 lb)   Height: 177.8 cm (70\")   PainSc: 0-No pain     Pain Score    12/07/21 0832   PainSc: 0-No pain     ECOG Performance Status: 0 - Asymptomatic    Constitutional: NAD, ECOG 0  Eyes: PERRLA, scleral anicteric  ENT: No LAD, no thyromegaly  Respiratory: CTAB, no wheezing, rales, rhonchi  Cardiovascular: RRR, no murmurs, pulses 2+ " bilaterally  Abdomen: soft, NT/ND, no HSM  Musculoskeletal: strength 5/5 bilaterally, no c/c/e  Neurologic: A&O x 3, CN II-XII intact grossly    Results Review:   Lab on 12/07/2021   Component Date Value Ref Range Status   • WBC 12/07/2021 0.90* 3.40 - 10.80 10*3/mm3 Final   • RBC 12/07/2021 4.05* 4.14 - 5.80 10*6/mm3 Final   • Hemoglobin 12/07/2021 12.8* 13.0 - 17.7 g/dL Final   • Hematocrit 12/07/2021 37.8  37.5 - 51.0 % Final   • MCV 12/07/2021 93.3  79.0 - 97.0 fL Final   • MCH 12/07/2021 31.6  26.6 - 33.0 pg Final   • MCHC 12/07/2021 33.9  31.5 - 35.7 g/dL Final   • RDW 12/07/2021 14.0  12.3 - 15.4 % Final   • RDW-SD 12/07/2021 47.9  37.0 - 54.0 fl Final   • MPV 12/07/2021 9.5  6.0 - 12.0 fL Final   • Platelets 12/07/2021 195  140 - 450 10*3/mm3 Final   • Neutrophil % 12/07/2021 47.8  42.7 - 76.0 % Final   • Lymphocyte % 12/07/2021 42.2  19.6 - 45.3 % Final   • Monocyte % 12/07/2021 6.7  5.0 - 12.0 % Final   • Eosinophil % 12/07/2021 3.3  0.3 - 6.2 % Final   • Basophil % 12/07/2021 0.0  0.0 - 1.5 % Final   • Immature Grans % 12/07/2021 0.0  0.0 - 0.5 % Final   • Neutrophils, Absolute 12/07/2021 0.43* 1.70 - 7.00 10*3/mm3 Final   • Lymphocytes, Absolute 12/07/2021 0.38* 0.70 - 3.10 10*3/mm3 Final   • Monocytes, Absolute 12/07/2021 0.06* 0.10 - 0.90 10*3/mm3 Final   • Eosinophils, Absolute 12/07/2021 0.03  0.00 - 0.40 10*3/mm3 Final   • Basophils, Absolute 12/07/2021 0.00  0.00 - 0.20 10*3/mm3 Final   • Immature Grans, Absolute 12/07/2021 0.00  0.00 - 0.05 10*3/mm3 Final   • nRBC 12/07/2021 0.0  0.0 - 0.2 /100 WBC Final       No results found.    Assessment / Plan      Assessment/Plan:   1. Other neutropenia (CMS/HCC) (Primary)  -Unclear etiology at this time  -Initial WBC 1.1 with an ANC of 600.  Per chart review is WBC was 4.8 in April 2019 with an ANC of 3400  -WBC 1.26 in July 2021.  ANC 0.76  -Platelet count and Hgb within normal limits  -LDH within normal limits, peripheral smear showing no immature cells  or blast  -Vitamin B12, folate, copper, zinc levels within normal limits.  Vitamin D has previously been deficient  -Status post bone marrow biopsy with aspirate on 7/26/2021  -Pathology nonconcerning for an acute malignancy or MDS.  Most notable for a reactive/regenerative process  -Unclear etiology for his neutropenia at this time  -Repeat CBC showing a WBC of 1.3 with an ANC of 0.8 in September 2021  -Iron studies within normal limits, hepatitis panel, EBV negative  -Repeat CBC 0.9 and ANC 0.43 in December 2021.  Platelet count and hemoglobin also mildly decreased as well  -We will plan to repeat bone marrow biopsy within the next 2 weeks and follow-up with me after     2.  Vitamin D deficiency  -Noted to be low on lab check in July 2021  -Continue vitamin D replacement  -We will plan to repeat vitamin D level prior to next visit     3.  Vitamin B12 deficiency  -Stable on replacement       Follow Up:   Follow-up after bone marrow biopsy     Judah Alcantara MD  Hematology and Oncology     Please note that portions of this note may have been completed with a voice recognition program. Efforts were made to edit the dictations, but occasionally words are mistranscribed.

## 2021-12-16 RX ORDER — ERGOCALCIFEROL 1.25 MG/1
50000 CAPSULE ORAL
Qty: 4 CAPSULE | Refills: 6 | Status: SHIPPED | OUTPATIENT
Start: 2021-12-16 | End: 2022-06-15

## 2021-12-22 ENCOUNTER — HOSPITAL ENCOUNTER (OUTPATIENT)
Dept: CT IMAGING | Facility: HOSPITAL | Age: 66
Discharge: HOME | End: 2021-12-22
Admitting: RADIOLOGY

## 2021-12-22 VITALS
TEMPERATURE: 97.6 F | WEIGHT: 200.6 LBS | RESPIRATION RATE: 20 BRPM | HEIGHT: 70 IN | SYSTOLIC BLOOD PRESSURE: 148 MMHG | BODY MASS INDEX: 28.72 KG/M2 | DIASTOLIC BLOOD PRESSURE: 89 MMHG | OXYGEN SATURATION: 100 % | HEART RATE: 67 BPM

## 2021-12-22 DIAGNOSIS — D70.8 OTHER NEUTROPENIA (HCC): ICD-10-CM

## 2021-12-22 LAB
ANION GAP SERPL CALCULATED.3IONS-SCNC: 9 MMOL/L (ref 5–15)
BASOPHILS # BLD AUTO: 0.01 10*3/MM3 (ref 0–0.2)
BASOPHILS NFR BLD AUTO: 1.1 % (ref 0–1.5)
BUN SERPL-MCNC: 13 MG/DL (ref 8–23)
BUN/CREAT SERPL: 16.5 (ref 7–25)
CALCIUM SPEC-SCNC: 8.5 MG/DL (ref 8.6–10.5)
CHLORIDE SERPL-SCNC: 105 MMOL/L (ref 98–107)
CO2 SERPL-SCNC: 25 MMOL/L (ref 22–29)
CREAT SERPL-MCNC: 0.79 MG/DL (ref 0.76–1.27)
DEPRECATED RDW RBC AUTO: 47.3 FL (ref 37–54)
EOSINOPHIL # BLD AUTO: 0.03 10*3/MM3 (ref 0–0.4)
EOSINOPHIL NFR BLD AUTO: 3.2 % (ref 0.3–6.2)
ERYTHROCYTE [DISTWIDTH] IN BLOOD BY AUTOMATED COUNT: 13.8 % (ref 12.3–15.4)
GFR SERPL CREATININE-BSD FRML MDRD: 98 ML/MIN/1.73
GLUCOSE SERPL-MCNC: 94 MG/DL (ref 65–99)
HCT VFR BLD AUTO: 36.4 % (ref 37.5–51)
HGB BLD-MCNC: 12.6 G/DL (ref 13–17.7)
IMM GRANULOCYTES # BLD AUTO: 0.01 10*3/MM3 (ref 0–0.05)
IMM GRANULOCYTES NFR BLD AUTO: 1.1 % (ref 0–0.5)
INR PPP: 0.99 (ref 0.85–1.16)
LYMPHOCYTES # BLD AUTO: 0.33 10*3/MM3 (ref 0.7–3.1)
LYMPHOCYTES NFR BLD AUTO: 34.7 % (ref 19.6–45.3)
MCH RBC QN AUTO: 32 PG (ref 26.6–33)
MCHC RBC AUTO-ENTMCNC: 34.6 G/DL (ref 31.5–35.7)
MCV RBC AUTO: 92.4 FL (ref 79–97)
MONOCYTES # BLD AUTO: 0.06 10*3/MM3 (ref 0.1–0.9)
MONOCYTES NFR BLD AUTO: 6.3 % (ref 5–12)
NEUTROPHILS NFR BLD AUTO: 0.51 10*3/MM3 (ref 1.7–7)
NEUTROPHILS NFR BLD AUTO: 53.6 % (ref 42.7–76)
NRBC BLD AUTO-RTO: 0 /100 WBC (ref 0–0.2)
PLATELET # BLD AUTO: 249 10*3/MM3 (ref 140–450)
PMV BLD AUTO: 9.7 FL (ref 6–12)
POTASSIUM SERPL-SCNC: 3.8 MMOL/L (ref 3.5–5.2)
PROTHROMBIN TIME: 12.8 SECONDS (ref 11.4–14.4)
RBC # BLD AUTO: 3.94 10*6/MM3 (ref 4.14–5.8)
SODIUM SERPL-SCNC: 139 MMOL/L (ref 136–145)
WBC NRBC COR # BLD: 0.95 10*3/MM3 (ref 3.4–10.8)

## 2021-12-22 PROCEDURE — 88305 TISSUE EXAM BY PATHOLOGIST: CPT | Performed by: INTERNAL MEDICINE

## 2021-12-22 PROCEDURE — 77012 CT SCAN FOR NEEDLE BIOPSY: CPT

## 2021-12-22 PROCEDURE — 88342 IMHCHEM/IMCYTCHM 1ST ANTB: CPT | Performed by: INTERNAL MEDICINE

## 2021-12-22 PROCEDURE — 88311 DECALCIFY TISSUE: CPT | Performed by: INTERNAL MEDICINE

## 2021-12-22 PROCEDURE — 85610 PROTHROMBIN TIME: CPT | Performed by: RADIOLOGY

## 2021-12-22 PROCEDURE — 88313 SPECIAL STAINS GROUP 2: CPT | Performed by: INTERNAL MEDICINE

## 2021-12-22 PROCEDURE — 88341 IMHCHEM/IMCYTCHM EA ADD ANTB: CPT | Performed by: INTERNAL MEDICINE

## 2021-12-22 PROCEDURE — 85025 COMPLETE CBC W/AUTO DIFF WBC: CPT | Performed by: RADIOLOGY

## 2021-12-22 PROCEDURE — 80048 BASIC METABOLIC PNL TOTAL CA: CPT | Performed by: RADIOLOGY

## 2021-12-22 PROCEDURE — 85097 BONE MARROW INTERPRETATION: CPT | Performed by: INTERNAL MEDICINE

## 2021-12-22 PROCEDURE — 25010000002 MIDAZOLAM PER 1 MG: Performed by: RADIOLOGY

## 2021-12-22 PROCEDURE — 25010000002 FENTANYL CITRATE (PF) 50 MCG/ML SOLUTION: Performed by: RADIOLOGY

## 2021-12-22 RX ORDER — HYDROCODONE BITARTRATE AND ACETAMINOPHEN 5; 325 MG/1; MG/1
1 TABLET ORAL EVERY 4 HOURS PRN
Status: DISCONTINUED | OUTPATIENT
Start: 2021-12-22 | End: 2021-12-23 | Stop reason: HOSPADM

## 2021-12-22 RX ORDER — SODIUM CHLORIDE 0.9 % (FLUSH) 0.9 %
10 SYRINGE (ML) INJECTION AS NEEDED
Status: DISCONTINUED | OUTPATIENT
Start: 2021-12-22 | End: 2021-12-23 | Stop reason: HOSPADM

## 2021-12-22 RX ORDER — MIDAZOLAM HYDROCHLORIDE 1 MG/ML
INJECTION INTRAMUSCULAR; INTRAVENOUS
Status: COMPLETED | OUTPATIENT
Start: 2021-12-22 | End: 2021-12-22

## 2021-12-22 RX ORDER — SODIUM CHLORIDE 0.9 % (FLUSH) 0.9 %
3 SYRINGE (ML) INJECTION EVERY 12 HOURS SCHEDULED
Status: DISCONTINUED | OUTPATIENT
Start: 2021-12-22 | End: 2021-12-23 | Stop reason: HOSPADM

## 2021-12-22 RX ORDER — FENTANYL CITRATE 50 UG/ML
INJECTION, SOLUTION INTRAMUSCULAR; INTRAVENOUS
Status: DISPENSED
Start: 2021-12-22 | End: 2021-12-22

## 2021-12-22 RX ORDER — LIDOCAINE HYDROCHLORIDE 10 MG/ML
10 INJECTION, SOLUTION INFILTRATION; PERINEURAL ONCE
Status: DISCONTINUED | OUTPATIENT
Start: 2021-12-22 | End: 2021-12-23 | Stop reason: HOSPADM

## 2021-12-22 RX ORDER — MIDAZOLAM HYDROCHLORIDE 1 MG/ML
INJECTION INTRAMUSCULAR; INTRAVENOUS
Status: DISPENSED
Start: 2021-12-22 | End: 2021-12-22

## 2021-12-22 RX ORDER — FENTANYL CITRATE 50 UG/ML
INJECTION, SOLUTION INTRAMUSCULAR; INTRAVENOUS
Status: COMPLETED | OUTPATIENT
Start: 2021-12-22 | End: 2021-12-22

## 2021-12-22 RX ADMIN — FENTANYL CITRATE 50 MCG: 50 INJECTION, SOLUTION INTRAMUSCULAR; INTRAVENOUS at 09:58

## 2021-12-22 RX ADMIN — MIDAZOLAM HYDROCHLORIDE 1 MG: 1 INJECTION, SOLUTION INTRAMUSCULAR; INTRAVENOUS at 09:58

## 2021-12-22 NOTE — POST-PROCEDURE NOTE
CT Guided Bone Marrow Biopsy                                                            History:                                                          : Andrew Salazar MD.    Assistant: None.                                                                                Modality: CT.                   DOSE REDUCTION: The examination was performed according to departmental dose-optimization program which includes automated exposure control, adjustment of the mA and/or kV according to patient size and/or use of iterative reconstruction technique.     Radiation dose:     mGy-cm.                                                                                   SEDATION: Moderate sedation was administered.     milligram of Versed and     micrograms of fentanyl IV was used for moderate sedation monitored under my direction. Total intra service time of sedation was     minutes. The patient's vital signs were monitored throughout the procedure and recorded in the patient's medical record by the nurse.    Anesthesia: Lidocaine local infiltration.    Estimated blood loss:  < 5 cc.             Technique:   A thorough discussion of the risks, benefits, and alternatives of the procedure, and if applicable, moderate sedation was carried out with the patient or the patient's next of kin. Any questions were answered. They verbalized understanding. A written informed consent was then signed.      A timeout was performed prior to starting the procedure.     The procedure room personnel used personal protective equipment. The operators used sterile gowns and gloves in addition. The surgical site was prepped with chlorhexidine gluconate and draped in the maximal sterile fashion.    The patient was laid prone on the CT table with a CT scan performed through the region of interest. Right posterior superior iliac spine was targeted for biopsy. A limited CT was performed through the region of interest with a grid in  place to determine access site, angle and depth.    A posterior oblique access into the posterior superior iliac spine on the target side was selected, marked on the skin and subjected to a sterile prep and drape with chlorhexidine gluconate. After local anesthesia and dermatotomy, a biopsy needle was positioned at the cortex of the posterior superior iliac spine. This was followed by use of a motorized drill to advance the biopsy needle into the marrow space for performance of bone marrow aspirate. Approximately 20 cc of aspirate was handed over to the technologist. The needle was then advanced using a motorized drill another approximately 1 cm into the bone marrow for a core biopsy that was handed over to the technologist. Hemostasis was achieved by manual compression. An aseptic dressing was applied.    The patient tolerated the procedure well and after recovery was discharged from the department in stable condition.                       Complications: None immediate.    Specimen: Bone marrow aspirate and bone core.                                                              Impression:                                                                Successful CT-guided bone marrow aspiration and bone biopsy using the right posterior superior iliac spine access under CT guidance as described above.    Thank you for the opportunity to assist in the care of your patient.

## 2021-12-22 NOTE — NURSING NOTE
CT guided bone marrow biopsy performed by Andrew Salazar MD. One core sample obtained, prepped, and sent to lab per CT tech. Patient tolerated well. 1 MG Versed and 50 MCG Fentanyl given during the procedure for a sedation time of 9 minutes. Report called to RN.

## 2021-12-22 NOTE — PRE-PROCEDURE NOTE
"[facility]   Vascular Interventional Radiology  History & Physicial    Patient Name:Jose Wells    : 1955  MRN: [id number]    Primary Care Physician: Provider, No Known    Referring Physician: Judah Alcantara MD     Date of admission: 2021    Subjective     Reason for Consult: Bone Marrow Biopsy    History of Present Illness   Jose Wells is a 66 y.o. male referred to IR as noted above.      Active Hospital Problems:  There are no active hospital problems to display for this patient.      Personal History     Past Medical History:   Diagnosis Date   • Hypertension    • Neutropenia (HCC)    • Skin cancer        Past Surgical History:   Procedure Laterality Date   • CHOLECYSTECTOMY     • HAND SURGERY Right    • KNEE SURGERY Right    • KNEE SURGERY Left        Family History: His family history includes Alzheimer's disease in his mother; Heart disease in his father; Stroke in his brother.     Social History: He  reports that he quit smoking about 7 years ago. His smoking use included cigarettes. He has a 10.00 pack-year smoking history. He has never used smokeless tobacco. He reports current alcohol use. He reports that he does not use drugs.    Home Medications:  Cyanocobalamin, atorvastatin, and vitamin D    Current Medications:  •  fentaNYL citrate (PF)  •  midazolam  •  sodium chloride  •  sodium chloride     Allergies:  He has No Known Allergies.    Review of Systems    Objective     Visit Vitals  /97 (BP Location: Left arm)   Pulse 68   Temp 97.6 °F (36.4 °C)   Resp 18   Ht 176.5 cm (69.5\")   Wt 91 kg (200 lb 9.6 oz)   SpO2 98%   BMI 29.20 kg/m²        Physical Exam    A&Ox3. Able to communicate  No Apparent Distress  Average physique    Result Review      I have personally reviewed the results from the time of this admission to 2021 09:40 EST and agree with these findings.  [x]  Laboratory  []  Microbiology  [x]  Radiology  []  EKG/Telemetry   []  " Cardiology/Vascular   []  Pathology  []  Old records  []  Other:    Most notable findings include: As noted:    Results from last 7 days   Lab Units 12/22/21  0837   INR  0.99   HEMOGLOBIN g/dL 12.6*   HEMATOCRIT % 36.4*   PLATELETS 10*3/mm3 249       Estimated Creatinine Clearance: 99.4 mL/min (by C-G formula based on SCr of 0.79 mg/dL).   Creatinine   Date Value Ref Range Status   12/22/2021 0.79 0.76 - 1.27 mg/dL Final       Assessment / Plan     Jose Wells is a 66 y.o. male referred to the IR service with above problem.    Plan:   As above.    Andrew Salazar MD   Vascular Interventional Radiology  12/22/21   9:40 AM EST

## 2021-12-23 ENCOUNTER — TELEPHONE (OUTPATIENT)
Dept: INFUSION THERAPY | Facility: HOSPITAL | Age: 66
End: 2021-12-23

## 2021-12-28 ENCOUNTER — TELEPHONE (OUTPATIENT)
Dept: ONCOLOGY | Facility: CLINIC | Age: 66
End: 2021-12-28

## 2021-12-28 NOTE — TELEPHONE ENCOUNTER
Discussed bone marrow biopsy with Dr. Alcantara. Nothing definitive showing the cause of patients chronic neutropenia. Advised patient he is more than welcome to receive a second opinion if he would like. Advised we will keep him scheduled as is for his 3 month follow up in March. Patient verbalized understanding.

## 2022-01-06 ENCOUNTER — TELEPHONE (OUTPATIENT)
Dept: ONCOLOGY | Facility: CLINIC | Age: 67
End: 2022-01-06

## 2022-01-06 NOTE — TELEPHONE ENCOUNTER
Caller: Jose Wells    Relationship: Self    Best call back number: [phone]    What is the best time to reach you: ANYTIME     Who are you requesting to speak with (clinical staff, provider,  specific staff member): NURSE/DR. WALKER     What was the call regarding: PATIENT CALLED TO INFORM THAT HE IS CONSIDERING A SECOND OPINION WITH A BENIGN HEMATOLOGIST AT [facility].  THE PHYSICIAN'S NAME IS DR. ANA VILLAFUERTE, WHO WOULD LIKE TO DISCUSS THIS PATIENT WITH DR. WALKER.  HE CAN BE REACHED AT: [phone]   FAX#: [phone]  PLEASE FAX: LABS, OFFICE NOTES INCLUDING ANY SCANS, ETC.  AND DIRECT NUMBER FOR DR. WALKER.       Do you require a callback: YES. PATIENT WOULD LIKE TO KNOW DR. WALKER'S THOUGHTS REGARDING SEEING A BENIGN HEMATOLOGIST.

## 2022-01-07 NOTE — TELEPHONE ENCOUNTER
Dr. Peterson called patient and I faxed records and also faxed Dr. Alcantara's cell number to physician Dr. Parra.

## 2022-01-10 LAB
CYTO UR: NORMAL
LAB AP ASPIRATE SMEAR: NORMAL
LAB AP CASE REPORT: NORMAL
LAB AP CLINICAL INFORMATION: NORMAL
LAB AP CLOT SECTION: NORMAL
LAB AP CORE BIOPSY: NORMAL
LAB AP CYTOGENETICS REPORT,ADDENDUM: NORMAL
LAB AP DIAGNOSIS COMMENT: NORMAL
LAB AP FLOW CYTOMETRY SUMMARY: NORMAL
PATH REPORT.FINAL DX SPEC: NORMAL
PATH REPORT.GROSS SPEC: NORMAL

## 2022-02-28 ENCOUNTER — TELEPHONE (OUTPATIENT)
Dept: ONCOLOGY | Facility: CLINIC | Age: 67
End: 2022-02-28

## 2022-02-28 NOTE — TELEPHONE ENCOUNTER
Caller: Jose Wells    Relationship: Self    Best call back number: [phone]    What is the best time to reach you: ANYTIME    Who are you requesting to speak with (clinical staff, provider,  specific staff member): DR WALKER OR NURSE    What was the call regarding: PT CALLED TO INFORM DR WALKER THAT HE HAS RECVD A DX - TLGL. HE SAID THAT DR BORATE IS REF HIM TO DR TRUONG BECAUSE HE SPECIALIZES IN THIS TYPE OF CANCER. PT CANC HIS 3/7 APPT WITH DR WALKER AT THIS TIME TO GIVE HIM TIME TO SEE DR TRUONG AND SEE WHAT HIS RECS ARE.     Do you require a callback: NO CALLBACK REQ UNLESS WOULD LIKE TO DISCUSS FURTHER WITH PT.

## 2022-03-23 ENCOUNTER — TELEPHONE (OUTPATIENT)
Dept: ONCOLOGY | Facility: CLINIC | Age: 67
End: 2022-03-23

## 2022-03-23 NOTE — TELEPHONE ENCOUNTER
Caller: Jose Wells    Relationship: Self    Best call back number: [phone]    What is the best time to reach you: ANYTIME    Who are you requesting to speak with (clinical staff, provider,  specific staff member): DR WALKER    What was the call regarding: PT STATED THAT HE WILL NEED LAB WORK MAR 29 OR 30 TO CHECK HIS LABS SINCE STARTING THE CHEMO (TOOK FIRST CHEMO 3/19). HE HAS THE LAB ORDER WITH HIM FROM DR TRUONG AT [facility]. PT WILL ALSO NEED A F/U APPT WITH DR WALKER IN 2 MONTHS WHICH WOULD BE MID MAY.     PLEASE CALL TO PT TO ADVISE.     Do you require a callback: YES

## 2022-03-24 DIAGNOSIS — D70.8 OTHER NEUTROPENIA: Primary | ICD-10-CM

## 2022-03-24 NOTE — TELEPHONE ENCOUNTER
Call to patient to notify we ordered lab work based on note from orders from doctor in [place].  Requested follow up appointment for patient in mid May.  Patient states understood

## 2022-03-29 ENCOUNTER — TELEPHONE (OUTPATIENT)
Dept: ONCOLOGY | Facility: CLINIC | Age: 67
End: 2022-03-29

## 2022-03-29 NOTE — TELEPHONE ENCOUNTER
Caller: WILL    Relationship: Self    Best call back number: [phone]    What is the best time to reach you: ASAP    Who are you requesting to speak with (clinical staff, provider,  specific staff member): DR. WALKER'S NURSE    Do you know the name of the person who called: BHAVANI    What was the call regarding: WILL HAS AN ORDER FROM [facility] THAT NEEDS TO BE DRAWN TOMORROW & IS INQUIRING IF DR. WALKER SHOULD ORDER THEM OR JUST USE THE ORDER FROM [facility].    Do you require a callback: YES, PLEASE CALL TODAY AS [facility] WANTS LABS DRAWN TOMORROW.

## 2022-03-29 NOTE — TELEPHONE ENCOUNTER
Left message with patient that lab work had been ordered based on orders from doctor in [place].  Call back if any questions.

## 2022-03-30 ENCOUNTER — LAB (OUTPATIENT)
Dept: LAB | Facility: HOSPITAL | Age: 67
End: 2022-03-30

## 2022-03-30 ENCOUNTER — TELEPHONE (OUTPATIENT)
Dept: ONCOLOGY | Facility: CLINIC | Age: 67
End: 2022-03-30

## 2022-03-30 DIAGNOSIS — D70.8 OTHER NEUTROPENIA: ICD-10-CM

## 2022-03-30 LAB
25(OH)D3 SERPL-MCNC: 63.8 NG/ML (ref 30–100)
ABO GROUP BLD: NORMAL
ALBUMIN SERPL-MCNC: 4 G/DL (ref 3.5–5.2)
ALBUMIN/GLOB SERPL: 1.5 G/DL
ALP SERPL-CCNC: 68 U/L (ref 39–117)
ALT SERPL W P-5'-P-CCNC: 14 U/L (ref 1–41)
ANION GAP SERPL CALCULATED.3IONS-SCNC: 9 MMOL/L (ref 5–15)
AST SERPL-CCNC: 19 U/L (ref 1–40)
BILIRUB SERPL-MCNC: 0.9 MG/DL (ref 0–1.2)
BUN SERPL-MCNC: 15 MG/DL (ref 8–23)
BUN/CREAT SERPL: 16.5 (ref 7–25)
CALCIUM SPEC-SCNC: 9.2 MG/DL (ref 8.6–10.5)
CHLORIDE SERPL-SCNC: 102 MMOL/L (ref 98–107)
CO2 SERPL-SCNC: 27 MMOL/L (ref 22–29)
CREAT SERPL-MCNC: 0.91 MG/DL (ref 0.76–1.27)
EGFRCR SERPLBLD CKD-EPI 2021: 91.8 ML/MIN/1.73
ERYTHROCYTE [DISTWIDTH] IN BLOOD BY AUTOMATED COUNT: 16.1 % (ref 12.3–15.4)
GLOBULIN UR ELPH-MCNC: 2.6 GM/DL
GLUCOSE SERPL-MCNC: 96 MG/DL (ref 65–99)
HCT VFR BLD AUTO: 37.7 % (ref 37.5–51)
HGB BLD-MCNC: 12.7 G/DL (ref 13–17.7)
LYMPHOCYTES # BLD AUTO: 0.4 10*3/MM3 (ref 0.7–3.1)
LYMPHOCYTES NFR BLD AUTO: 40.8 % (ref 19.6–45.3)
MCH RBC QN AUTO: 32.2 PG (ref 26.6–33)
MCHC RBC AUTO-ENTMCNC: 33.7 G/DL (ref 31.5–35.7)
MCV RBC AUTO: 95.6 FL (ref 79–97)
MONOCYTES # BLD AUTO: 0.1 10*3/MM3 (ref 0.1–0.9)
MONOCYTES NFR BLD AUTO: 4.7 % (ref 5–12)
NEUTROPHILS NFR BLD AUTO: 0.6 10*3/MM3 (ref 1.7–7)
NEUTROPHILS NFR BLD AUTO: 54.5 % (ref 42.7–76)
PLATELET # BLD AUTO: 244 10*3/MM3 (ref 140–450)
PMV BLD AUTO: 7.1 FL (ref 6–12)
POTASSIUM SERPL-SCNC: 3.6 MMOL/L (ref 3.5–5.2)
PROT SERPL-MCNC: 6.6 G/DL (ref 6–8.5)
RBC # BLD AUTO: 3.94 10*6/MM3 (ref 4.14–5.8)
RH BLD: POSITIVE
SODIUM SERPL-SCNC: 138 MMOL/L (ref 136–145)
WBC NRBC COR # BLD: 1.1 10*3/MM3 (ref 3.4–10.8)

## 2022-03-30 PROCEDURE — 36415 COLL VENOUS BLD VENIPUNCTURE: CPT

## 2022-03-30 PROCEDURE — 86901 BLOOD TYPING SEROLOGIC RH(D): CPT

## 2022-03-30 PROCEDURE — 86900 BLOOD TYPING SEROLOGIC ABO: CPT

## 2022-03-30 PROCEDURE — 80053 COMPREHEN METABOLIC PANEL: CPT

## 2022-03-30 PROCEDURE — 82306 VITAMIN D 25 HYDROXY: CPT

## 2022-03-30 PROCEDURE — 85025 COMPLETE CBC W/AUTO DIFF WBC: CPT

## 2022-03-30 NOTE — TELEPHONE ENCOUNTER
Call to patient after Dr Alcantara reviewed lab results.  Patient states understood.  No orders given

## 2022-03-30 NOTE — TELEPHONE ENCOUNTER
----- Message from Evelyn Howard RN sent at 3/30/2022 11:35 AM EDT -----  Regarding: RE: critical lab    Name of Physician notified:     Time Physician Notified: 1135        Orders received      Protocol/Standing orders followed      No new orders  No new orders.  Notified patient of results/.       ----- Message -----  From: Evelyn Howard RN  Sent: 3/30/2022  11:33 AM EDT  To: Evelyn Howard RN  Subject: critical lab                                         Critical Test Results      MD: Quinton    Date: 03/30/2022     Critical test result: WBC 1.1    Time results received:1133

## 2022-05-12 ENCOUNTER — TELEPHONE (OUTPATIENT)
Dept: ONCOLOGY | Facility: CLINIC | Age: 67
End: 2022-05-12

## 2022-05-12 NOTE — TELEPHONE ENCOUNTER
Caller: Priscilla Joseluba Bedoya    Relationship: Self    Best call back number: [phone]    What is the best time to reach you: ANYTIME     CAN LEAVE VOICEMAIL WITH NAME AND NUMBER TO CALL BACK IF UNABLE TO REACH .     Who are you requesting to speak with (clinical staff, provider,  specific staff member): DR WALKER OR HIS NURSE      What was the call regarding:     WANTED TO SEE IF CAN HAVE BLOOD WORK FOR DR WALKER DONE ON Monday 05/16 PRIOR TO APPT SCHEDULED 05/17      AND ALSO WANTED TO SEE IF CAN HAVE ANTIGEN TEST FOR COVID ADDED ON TO SEE IF BOOSTER IS WORKING    AND WANTED TO MAKE SURE HAD EVERYTHING NEEDED IN CONJUNCTION WITH OTHER ONCOLOGIST DR TRUONG SEES AT THE [facility] IN [facility] FOR LABS    Do you require a callback: YES

## 2022-05-13 ENCOUNTER — TELEPHONE (OUTPATIENT)
Dept: ONCOLOGY | Facility: CLINIC | Age: 67
End: 2022-05-13

## 2022-05-13 ENCOUNTER — LAB (OUTPATIENT)
Dept: LAB | Facility: HOSPITAL | Age: 67
End: 2022-05-13

## 2022-05-13 DIAGNOSIS — D70.8 OTHER NEUTROPENIA: ICD-10-CM

## 2022-05-13 DIAGNOSIS — D70.8 OTHER NEUTROPENIA: Primary | ICD-10-CM

## 2022-05-13 LAB
ALBUMIN SERPL-MCNC: 3.9 G/DL (ref 3.5–5.2)
ALBUMIN/GLOB SERPL: 1.5 G/DL
ALP SERPL-CCNC: 70 U/L (ref 39–117)
ALT SERPL W P-5'-P-CCNC: 14 U/L (ref 1–41)
ANION GAP SERPL CALCULATED.3IONS-SCNC: 10 MMOL/L (ref 5–15)
AST SERPL-CCNC: 18 U/L (ref 1–40)
BILIRUB SERPL-MCNC: 1 MG/DL (ref 0–1.2)
BUN SERPL-MCNC: 13 MG/DL (ref 8–23)
BUN/CREAT SERPL: 11.9 (ref 7–25)
CALCIUM SPEC-SCNC: 8.9 MG/DL (ref 8.6–10.5)
CHLORIDE SERPL-SCNC: 102 MMOL/L (ref 98–107)
CO2 SERPL-SCNC: 26 MMOL/L (ref 22–29)
CREAT SERPL-MCNC: 1.09 MG/DL (ref 0.76–1.27)
EGFRCR SERPLBLD CKD-EPI 2021: 73.9 ML/MIN/1.73
ERYTHROCYTE [DISTWIDTH] IN BLOOD BY AUTOMATED COUNT: 16.7 % (ref 12.3–15.4)
GLOBULIN UR ELPH-MCNC: 2.6 GM/DL
GLUCOSE SERPL-MCNC: 103 MG/DL (ref 65–99)
HCT VFR BLD AUTO: 39.6 % (ref 37.5–51)
HGB BLD-MCNC: 12.6 G/DL (ref 13–17.7)
LYMPHOCYTES # BLD AUTO: 0.6 10*3/MM3 (ref 0.7–3.1)
LYMPHOCYTES NFR BLD AUTO: 39 % (ref 19.6–45.3)
MCH RBC QN AUTO: 30.2 PG (ref 26.6–33)
MCHC RBC AUTO-ENTMCNC: 31.9 G/DL (ref 31.5–35.7)
MCV RBC AUTO: 94.7 FL (ref 79–97)
MONOCYTES # BLD AUTO: 0.1 10*3/MM3 (ref 0.1–0.9)
MONOCYTES NFR BLD AUTO: 6.8 % (ref 5–12)
NEUTROPHILS NFR BLD AUTO: 0.8 10*3/MM3 (ref 1.7–7)
NEUTROPHILS NFR BLD AUTO: 54.2 % (ref 42.7–76)
PLATELET # BLD AUTO: 266 10*3/MM3 (ref 140–450)
PMV BLD AUTO: 6.5 FL (ref 6–12)
POTASSIUM SERPL-SCNC: 3.8 MMOL/L (ref 3.5–5.2)
PROT SERPL-MCNC: 6.5 G/DL (ref 6–8.5)
RBC # BLD AUTO: 4.19 10*6/MM3 (ref 4.14–5.8)
SODIUM SERPL-SCNC: 138 MMOL/L (ref 136–145)
WBC NRBC COR # BLD: 1.5 10*3/MM3 (ref 3.4–10.8)

## 2022-05-13 PROCEDURE — 80053 COMPREHEN METABOLIC PANEL: CPT

## 2022-05-13 PROCEDURE — 36415 COLL VENOUS BLD VENIPUNCTURE: CPT

## 2022-05-13 PROCEDURE — 86769 SARS-COV-2 COVID-19 ANTIBODY: CPT

## 2022-05-13 PROCEDURE — 85025 COMPLETE CBC W/AUTO DIFF WBC: CPT

## 2022-05-13 NOTE — TELEPHONE ENCOUNTER
Name of Physician notified: Judah Alcantara    Time Physician Notified:   [id number]    []  Orders received    []  Protocol/Standing orders followed    [x]  No new orders

## 2022-05-13 NOTE — TELEPHONE ENCOUNTER
Call to Jose to report we have placed his lab work orders.  Also provided his information to Greg Corcoran for [facility].  Jose states understood

## 2022-05-13 NOTE — TELEPHONE ENCOUNTER
Critical Test Results      MD: Judah Alcantara MD    Date: 5/13/22     Critical test result: WBC 1.5, ANC 0.8    Time results received: 9281

## 2022-05-14 LAB
SARS-COV-2 AB SERPL IA-ACNC: 3703 U/ML
SARS-COV-2 AB SERPL IA-ACNC: NORMAL U/ML
SARS-COV-2 AB SERPL-IMP: POSITIVE

## 2022-05-17 ENCOUNTER — OFFICE VISIT (OUTPATIENT)
Dept: ONCOLOGY | Facility: CLINIC | Age: 67
End: 2022-05-17

## 2022-05-17 VITALS
OXYGEN SATURATION: 97 % | SYSTOLIC BLOOD PRESSURE: 157 MMHG | BODY MASS INDEX: 28.49 KG/M2 | HEART RATE: 84 BPM | DIASTOLIC BLOOD PRESSURE: 80 MMHG | HEIGHT: 70 IN | WEIGHT: 199 LBS | RESPIRATION RATE: 18 BRPM | TEMPERATURE: 98.6 F

## 2022-05-17 DIAGNOSIS — C91.Z0 T-CELL LARGE GRANULAR LYMPHOCYTIC LEUKEMIA: Primary | ICD-10-CM

## 2022-05-17 LAB
LABORATORY COMMENT REPORT: NORMAL
Lab: NORMAL
PATHOLOGIST NAME: NORMAL
SARS-COV-2 IGM SERPL IA-ACNC: 0.04 COI
SARS-COV-2 IGM SERPL QL IA: NEGATIVE

## 2022-05-17 PROCEDURE — 99214 OFFICE O/P EST MOD 30 MIN: CPT | Performed by: INTERNAL MEDICINE

## 2022-05-17 RX ORDER — ACYCLOVIR 800 MG/1
800 TABLET ORAL 2 TIMES DAILY
COMMUNITY
Start: 2022-04-28 | End: 2022-09-20 | Stop reason: SDUPTHER

## 2022-05-17 RX ORDER — KETOCONAZOLE 20 MG/ML
SHAMPOO TOPICAL
COMMUNITY
Start: 2022-05-09

## 2022-05-17 RX ORDER — FOLIC ACID 1 MG/1
TABLET ORAL
COMMUNITY
Start: 2022-04-14 | End: 2022-09-20

## 2022-05-17 NOTE — PROGRESS NOTES
Follow Up Office Visit      Date: 2022     Patient Name: Jose Wells  MRN: [id number]  : 1955  Referring Physician: Kiya Grossman     Chief Complaint:  Follow-up for neutropenia     History of Present Illness: Jose Wells is a pleasant 65 y.o. male past medical history of hyperlipidemia and hypertension who presents today for evaluation of neutropenia. The patient is accompanied by their wife who contributes to the history of their care.  Patient states that over the past couple weeks he has been feeling more fatigued.  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  Denies any unexplained fevers, infections, night sweats, weight loss.  He was seen by his PCP who checked a CBC which was notable for WBC of 1.1 with an ANC of 0.6.  Hemoglobin and platelet count were within normal limits.  He denies any family history of any leukemias.  Does note that him and his wife recently traveled to [place].  States that during the trip he was taking excessive dose of a vitamin C multivitamin.  Has continued to take a multivitamin since being home.  Per chart review he had a WBC of 4.8 in 2019 with an ANC of 3400 at that time.  He otherwise feels well today and is compliant with his home medications     Interval History:  Presents to clinic for follow-up.  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  He was started on methotrexate and is tolerating well.  Currently taking the 20 mg weekly dosing.  Denies any significant abdominal pain, nausea, vomiting, diarrhea.  Does note headaches for about 1-2 days after the medication.  He denies unexplained fevers, chills, night sweats, weight loss    Oncology History:    Oncology/Hematology History    No history exists.       Subjective      Review of Systems:   Constitutional: Negative for fevers, chills, or weight loss  Eyes: Negative  for blurred vision or discharge         Ear/Nose/Throat: Negative for difficulty swallowing, sore throat, LAD                                                       Respiratory: Negative for cough, SOA, wheezing                                                                                        Cardiovascular: Negative for chest pain or palpitations                                                                  Gastrointestinal: Negative for nausea, vomiting or diarrhea                                                                     Genitourinary: Negative for dysuria or hematuria                                                                                           Musculoskeletal: Negative for any joint pains or muscle aches                                                                        Neurologic: Negative for any weakness, headaches, dizziness                                                                         Hematologic: Negative for any easy bleeding or bruising                                                                                   Psychiatric: Negative for anxiety or depression                          Past Medical History/Past Surgical History/ Family History/ Social History: Reviewed by me and unchanged from my previous documentation done on December 2021.     Medications:     Current Outpatient Medications:   •  acyclovir (ZOVIRAX) 800 MG tablet, Take 800 mg by mouth 2 (Two) Times a Day., Disp: , Rfl:   •  Cyanocobalamin (VITAMIN B 12 PO), Take  by mouth., Disp: , Rfl:   •  folic acid (FOLVITE) 1 MG tablet, TAKE 1 TABLET BY MOUTH ON DAYS WHEN METHOTREXATE IS NOT TAKEN., Disp: , Rfl:   •  ketoconazole (NIZORAL) 2 % shampoo, SHAMPOO DAILY AS NEEDED, Disp: , Rfl:   •  triamcinolone (KENALOG) 0.1 % ointment, APPLY THIN LAYER TWICE DAILY X 1-2 WEEKS TO AFFECTED AREA. NO MORE THAN 2 WEEKS AT A TIME, Disp: , Rfl:   •  vitamin D (ERGOCALCIFEROL) 1.25 MG (36068 UT) capsule  "capsule, TAKE 1 CAPSULE BY MOUTH EVERY 7 (SEVEN) DAYS., Disp: 4 capsule, Rfl: 6    Allergies:   Allergies   Allergen Reactions   • Levofloxacin Swelling     Swelling of tendons       Objective     Physical Exam:  Vital Signs:   Vitals:    05/17/22 1056   BP: 157/80   Pulse: 84   Resp: 18   Temp: 98.6 °F (37 °C)   SpO2: 97%   Weight: 90.3 kg (199 lb)   Height: 177.8 cm (70\")   PainSc: 0-No pain     Pain Score    05/17/22 1056   PainSc: 0-No pain     ECOG Performance Status: 0 - Asymptomatic    Constitutional: NAD, ECOG 0  Eyes: PERRLA, scleral anicteric  ENT: No LAD, no thyromegaly  Respiratory: CTAB, no wheezing, rales, rhonchi  Cardiovascular: RRR, no murmurs, pulses 2+ bilaterally  Abdomen: soft, NT/ND, no HSM  Musculoskeletal: strength 5/5 bilaterally, no c/c/e  Neurologic: A&O x 3, CN II-XII intact grossly    Results Review:   Lab on 05/13/2022   Component Date Value Ref Range Status   • Glucose 05/13/2022 103 (A) 65 - 99 mg/dL Final   • BUN 05/13/2022 13  8 - 23 mg/dL Final   • Creatinine 05/13/2022 1.09  0.76 - 1.27 mg/dL Final   • Sodium 05/13/2022 138  136 - 145 mmol/L Final   • Potassium 05/13/2022 3.8  3.5 - 5.2 mmol/L Final   • Chloride 05/13/2022 102  98 - 107 mmol/L Final   • CO2 05/13/2022 26.0  22.0 - 29.0 mmol/L Final   • Calcium 05/13/2022 8.9  8.6 - 10.5 mg/dL Final   • Total Protein 05/13/2022 6.5  6.0 - 8.5 g/dL Final   • Albumin 05/13/2022 3.90  3.50 - 5.20 g/dL Final   • ALT (SGPT) 05/13/2022 14  1 - 41 U/L Final   • AST (SGOT) 05/13/2022 18  1 - 40 U/L Final   • Alkaline Phosphatase 05/13/2022 70  39 - 117 U/L Final   • Total Bilirubin 05/13/2022 1.0  0.0 - 1.2 mg/dL Final   • Globulin 05/13/2022 2.6  gm/dL Final    Calculated Result   • A/G Ratio 05/13/2022 1.5  g/dL Final   • BUN/Creatinine Ratio 05/13/2022 11.9  7.0 - 25.0 Final   • Anion Gap 05/13/2022 10.0  5.0 - 15.0 mmol/L Final   • eGFR 05/13/2022 73.9  >60.0 mL/min/1.73 Final    National Kidney Foundation and American Society of " Nephrology (ASN) Task Force recommended calculation based on the Chronic Kidney Disease Epidemiology Collaboration (CKD-EPI) equation refit without adjustment for race.   • SARS-CoV-2 Semi-Quant Ab 05/13/2022 See Dilution  Negative<0.8 U/mL Final   • SARS-COV-2 SPIKE AB INTERP 05/13/2022 Positive   Final    Comment: Antibodies against the SARS-CoV-2 spike protein receptor binding  domain (RBD) were detected. It is yet undetermined what level of  antibody to SARS-CoV-2 spike protein correlates to immunity against  developing symptomatic SARS-CoV-2 disease. Studies are underway to  measure the quantitative levels of specific SARS-CoV-2 antibodies  following vaccination. Such studies will provide valuable insights  into the correlation between protection from vaccination and  antibody levels.  Roche Elecsys Anti-SARS-CoV-2 S    **Effective March 28, 2022 [employer] expanded the reporting range**    of results for test 602908 SARS-Cov-2 Semi-Quantitative Total    Antibody, Balwinder. Results previously reported for this assay    were 0.8 - 2500 U/mL with higher values reported as >2500 U/mL.    With the addition of an automated dilution, we are now able to    report results 0.8 - 54237 U/mL with higher values reported as    >51567 U/mL. This change does not impact                            previously reported    results, it just increases the numerical values above 2500 U/mL    that we are able to report.   • WBC 05/13/2022 1.50 (A) 3.40 - 10.80 10*3/mm3 Final    Verified by repeat analysis.    • RBC 05/13/2022 4.19  4.14 - 5.80 10*6/mm3 Final   • Hemoglobin 05/13/2022 12.6 (A) 13.0 - 17.7 g/dL Final   • Hematocrit 05/13/2022 39.6  37.5 - 51.0 % Final   • RDW 05/13/2022 16.7 (A) 12.3 - 15.4 % Final   • MCV 05/13/2022 94.7  79.0 - 97.0 fL Final   • MCH 05/13/2022 30.2  26.6 - 33.0 pg Final   • MCHC 05/13/2022 31.9  31.5 - 35.7 g/dL Final   • MPV 05/13/2022 6.5  6.0 - 12.0 fL Final   • Platelets 05/13/2022 423  140 - 282  10*3/mm3 Final   • Neutrophil % 05/13/2022 54.2  42.7 - 76.0 % Final   • Lymphocyte % 05/13/2022 39.0  19.6 - 45.3 % Final   • Monocyte % 05/13/2022 6.8  5.0 - 12.0 % Final   • Neutrophils, Absolute 05/13/2022 0.80 (A) 1.70 - 7.00 10*3/mm3 Final   • Lymphocytes, Absolute 05/13/2022 0.60 (A) 0.70 - 3.10 10*3/mm3 Final   • Monocytes, Absolute 05/13/2022 0.10  0.10 - 0.90 10*3/mm3 Final   • SARS-COV-2 SPIKE AB DILUTION 05/13/2022 3703  Negative<0.8 U/mL Final       No results found.    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  Per chart review is WBC was 4.8 in April 2019 with an ANC of 3400  -WBC 1.26 in July 2021.  ANC 0.76  -Platelet count and Hgb within normal limits  -LDH within normal limits, peripheral smear showing no immature cells or blast  -Vitamin B12, folate, copper, zinc levels within normal limits.  Vitamin D has previously been deficient  -Status post bone marrow biopsy with aspirate on 7/26/2021  -Pathology nonconcerning for an acute malignancy or MDS.  Most notable for a reactive/regenerative process  -Iron studies within normal limits, hepatitis panel, EBV negative  -Repeat CBC 0.9 and ANC 0.43 in December 2021.  -Repeat bone marrow biopsy in December 2021 without evidence of an acute malignancy  -Patient seen at [facility] where he had a positive TCR gene rearrangement study with next-generation sequencing showing a STAT3 mutation indicative of T-cell-LGL  -Started on weekly methotrexate and tolerating well  -Labs in May 2022 stable  -Patient to have repeat labs in 3 months at [facility] and will follow me in 6 months for repeat labs     2.  Vitamin D deficiency  -Noted to be low on lab check in July 2021  -Continue vitamin D replacement  -We will plan to repeat vitamin D level prior to next visit     3.  Vitamin B12 deficiency  -Stable on replacement    4.  Health  maintenance  - Given his indolent hematologic malignancy and on active immunosuppressive treatment, will set the patient up with Evgriselda for primary prophylaxis against COVID-19    Follow Up:   Follow-up in 6 months or sooner if needed     Judah Alcantara MD  Hematology and Oncology     Please note that portions of this note may have been completed with a voice recognition program. Efforts were made to edit the dictations, but occasionally words are mistranscribed.

## 2022-05-18 PROBLEM — D84.9 IMMUNOCOMPROMISED (HCC): Status: ACTIVE | Noted: 2022-05-18

## 2022-05-18 RX ORDER — EPINEPHRINE 1 MG/ML
0.3 INJECTION, SOLUTION, CONCENTRATE INTRAVENOUS AS NEEDED
Status: CANCELLED | OUTPATIENT
Start: 2022-05-25

## 2022-05-18 RX ORDER — DIPHENHYDRAMINE HCL 50 MG
50 CAPSULE ORAL ONCE AS NEEDED
Status: CANCELLED | OUTPATIENT
Start: 2022-05-25

## 2022-05-25 ENCOUNTER — HOSPITAL ENCOUNTER (OUTPATIENT)
Dept: ONCOLOGY | Facility: HOSPITAL | Age: 67
Discharge: HOME | End: 2022-05-25
Admitting: INTERNAL MEDICINE

## 2022-05-25 VITALS
TEMPERATURE: 98.7 F | BODY MASS INDEX: 28.49 KG/M2 | RESPIRATION RATE: 18 BRPM | SYSTOLIC BLOOD PRESSURE: 131 MMHG | HEIGHT: 70 IN | HEART RATE: 77 BPM | WEIGHT: 199 LBS | DIASTOLIC BLOOD PRESSURE: 77 MMHG

## 2022-05-25 DIAGNOSIS — C91.Z0 T-CELL LARGE GRANULAR LYMPHOCYTIC LEUKEMIA: ICD-10-CM

## 2022-05-25 DIAGNOSIS — D84.9 IMMUNOCOMPROMISED: Primary | ICD-10-CM

## 2022-05-25 PROCEDURE — 96372 THER/PROPH/DIAG INJ SC/IM: CPT

## 2022-05-25 PROCEDURE — M0220 HC INJECTION, TIXAGEVIMAB AND CILGAVIMAB: HCPCS | Performed by: INTERNAL MEDICINE

## 2022-05-25 PROCEDURE — 25010000002 INJECTION, TIXAGEVIMAB AND CILGAVIMAB,: Performed by: INTERNAL MEDICINE

## 2022-05-25 RX ORDER — EPINEPHRINE 1 MG/ML
0.3 INJECTION, SOLUTION, CONCENTRATE INTRAVENOUS AS NEEDED
Status: CANCELLED | OUTPATIENT
Start: 2022-05-25

## 2022-05-25 RX ORDER — DIPHENHYDRAMINE HCL 50 MG
50 CAPSULE ORAL ONCE AS NEEDED
Status: CANCELLED | OUTPATIENT
Start: 2022-05-25

## 2022-05-25 RX ADMIN — AZD7442 300 MG: KIT at 15:20

## 2022-06-15 RX ORDER — ERGOCALCIFEROL 1.25 MG/1
CAPSULE ORAL
Qty: 4 CAPSULE | Refills: 6 | Status: SHIPPED | OUTPATIENT
Start: 2022-06-15 | End: 2022-11-21

## 2022-07-14 ENCOUNTER — TELEPHONE (OUTPATIENT)
Dept: ONCOLOGY | Facility: CLINIC | Age: 67
End: 2022-07-14

## 2022-07-14 NOTE — TELEPHONE ENCOUNTER
Spoke with patient, he does not need a sooner appointment but rather needs a lab draw. He is going to call his doctors in [place] to see if they can send him paper orders for the labs they need or ask if Dr. Alcantara should put in orders to have labs drawn here then fax it to [place]. Pt will call us back if he needs Dr. Alcantara to put in additional lab orders for him.

## 2022-07-14 NOTE — TELEPHONE ENCOUNTER
Caller: Jose Wells    Relationship: Self    Best call back number: [phone]    What is the best time to reach you: ANYTIME    Who are you requesting to speak with (clinical staff, provider,  specific staff member): CLINICAL    What was the call regarding: PATIENT STATES PROVIDER MD GLADYS TRUONG AT THE [facility] SENT ORDERS FOR LABS ON 6/23/22. WILL HAVE OFFICE RESEND TODAY AND WILL PLAN TO COMPLETE WITH OUR OFFICE ON 7/21/22    Do you require a callback: YES

## 2022-07-14 NOTE — TELEPHONE ENCOUNTER
Caller: Jose Wells    Relationship to patient: Self    Best call back number: [phone]    Type of visit: LABS    Requested date: 07/21 MORNING     Additional notes: PLEASE CALL ONCE SCHEDULED    HE HAS NEW INSURANCE (EFFECTIVE 07/01) BUT DOES NOT HAVE THE CARDS YET.

## 2022-07-15 NOTE — TELEPHONE ENCOUNTER
Caller: Jose Wells    Relationship to patient: Self    Best call back number: [phone]    Patient is needing: TO REQUEST CALL FROM [facility] ON STATUS UPDATE. HE WANTS TO KNOW IF WE RECEIVED THE FAX FROM [place].

## 2022-07-15 NOTE — TELEPHONE ENCOUNTER
Orders have been obtained, pt advised he can go to any [facility] lab anytime to have them drawn.

## 2022-07-15 NOTE — TELEPHONE ENCOUNTER
[facility] is in Care Everywhere.  I talked with Dr. Alcantara and he said we only need a cbc and cmp and those labs are already ordered so he can go to our lab at his convenience.  Dr. Alcantara will ask Evelyn CELESTE to fax results to [facility] physician.

## 2022-07-21 ENCOUNTER — TELEPHONE (OUTPATIENT)
Dept: ONCOLOGY | Facility: CLINIC | Age: 67
End: 2022-07-21

## 2022-07-21 ENCOUNTER — LAB (OUTPATIENT)
Dept: LAB | Facility: HOSPITAL | Age: 67
End: 2022-07-21

## 2022-07-21 DIAGNOSIS — C91.Z0 T-CELL LARGE GRANULAR LYMPHOCYTIC LEUKEMIA: ICD-10-CM

## 2022-07-21 LAB
ALBUMIN SERPL-MCNC: 4.1 G/DL (ref 3.5–5.2)
ALBUMIN/GLOB SERPL: 1.5 G/DL
ALP SERPL-CCNC: 76 U/L (ref 39–117)
ALT SERPL W P-5'-P-CCNC: 14 U/L (ref 1–41)
ANION GAP SERPL CALCULATED.3IONS-SCNC: 10 MMOL/L (ref 5–15)
AST SERPL-CCNC: 23 U/L (ref 1–40)
BILIRUB SERPL-MCNC: 0.9 MG/DL (ref 0–1.2)
BUN SERPL-MCNC: 14 MG/DL (ref 8–23)
BUN/CREAT SERPL: 16.7 (ref 7–25)
CALCIUM SPEC-SCNC: 9.1 MG/DL (ref 8.6–10.5)
CHLORIDE SERPL-SCNC: 106 MMOL/L (ref 98–107)
CO2 SERPL-SCNC: 27 MMOL/L (ref 22–29)
CREAT SERPL-MCNC: 0.84 MG/DL (ref 0.76–1.27)
EGFRCR SERPLBLD CKD-EPI 2021: 95 ML/MIN/1.73
ERYTHROCYTE [DISTWIDTH] IN BLOOD BY AUTOMATED COUNT: 15.8 % (ref 12.3–15.4)
GLOBULIN UR ELPH-MCNC: 2.7 GM/DL
GLUCOSE SERPL-MCNC: 86 MG/DL (ref 65–99)
HCT VFR BLD AUTO: 37.2 % (ref 37.5–51)
HGB BLD-MCNC: 12.3 G/DL (ref 13–17.7)
LYMPHOCYTES # BLD AUTO: 0.7 10*3/MM3 (ref 0.7–3.1)
LYMPHOCYTES NFR BLD AUTO: 38.6 % (ref 19.6–45.3)
MCH RBC QN AUTO: 31.4 PG (ref 26.6–33)
MCHC RBC AUTO-ENTMCNC: 33 G/DL (ref 31.5–35.7)
MCV RBC AUTO: 95.2 FL (ref 79–97)
MONOCYTES # BLD AUTO: 0.1 10*3/MM3 (ref 0.1–0.9)
MONOCYTES NFR BLD AUTO: 4.1 % (ref 5–12)
NEUTROPHILS NFR BLD AUTO: 1 10*3/MM3 (ref 1.7–7)
NEUTROPHILS NFR BLD AUTO: 57.3 % (ref 42.7–76)
PLATELET # BLD AUTO: 292 10*3/MM3 (ref 140–450)
PMV BLD AUTO: 6.8 FL (ref 6–12)
POTASSIUM SERPL-SCNC: 3.9 MMOL/L (ref 3.5–5.2)
PROT SERPL-MCNC: 6.8 G/DL (ref 6–8.5)
RBC # BLD AUTO: 3.91 10*6/MM3 (ref 4.14–5.8)
SODIUM SERPL-SCNC: 143 MMOL/L (ref 136–145)
WBC NRBC COR # BLD: 1.7 10*3/MM3 (ref 3.4–10.8)

## 2022-07-21 PROCEDURE — 85025 COMPLETE CBC W/AUTO DIFF WBC: CPT

## 2022-07-21 PROCEDURE — 36415 COLL VENOUS BLD VENIPUNCTURE: CPT

## 2022-07-21 PROCEDURE — 80053 COMPREHEN METABOLIC PANEL: CPT

## 2022-07-21 NOTE — TELEPHONE ENCOUNTER
Name of Physician notified: Judah Alcantara    Time Physician Notified: 1156am      []  Orders received    []  Protocol/Standing orders followed    [x]  No new orders

## 2022-07-21 NOTE — TELEPHONE ENCOUNTER
----- Message from Heide Gr RN sent at 7/21/2022 11:46 AM EDT -----      Critical Test Results      MD: Quinton    Date: 7/21/22     Critical test result:   WBC  1.7    Time results received:  11:45

## 2022-07-22 ENCOUNTER — TELEPHONE (OUTPATIENT)
Dept: ONCOLOGY | Facility: CLINIC | Age: 67
End: 2022-07-22

## 2022-08-23 ENCOUNTER — TELEPHONE (OUTPATIENT)
Dept: ONCOLOGY | Facility: CLINIC | Age: 67
End: 2022-08-23

## 2022-08-23 NOTE — TELEPHONE ENCOUNTER
Caller: Jose Wells    Relationship: Self    Best call back number: [phone]    What is the best time to reach you: ANYTIME    Who are you requesting to speak with (clinical staff, provider,  specific staff member): MAGDA RICHARDS RN    What was the call regarding: PT RETURNING CALL FOR MAGDA - TRIED TO W/T TO OFFICE BUT WAS UNSUCCESSFUL.    PLEASE RETURN CALL TO PT.     Do you require a callback: YES

## 2022-08-23 NOTE — TELEPHONE ENCOUNTER
RETURNED CALLED TO SCHEDULED FOLLOW UP APPT HOWEVER PATIENT WANTED TO MENTIONE THAT HE'S OFF OF METHOTREXATE AND WANTS TO DISCUSS HIS TWO OTHER CLINICAL TRIAL OPTIONS. VIDAZIA AND CYTOTREXIN. PLEASE CONTACT PATIENT WITH MORE INFORMATION REGARDING THESE. PLEASE ADVISE.

## 2022-08-23 NOTE — TELEPHONE ENCOUNTER
Caller: Jose Wells    Relationship to patient: Self    Best call back number: [phone]    Chief complaint: WANTS TO DISCUSS TREATMENT OPTIONS    Type of visit: FOLLOW UP    Requested date: IN September AFTER 09/19    If rescheduling, when is the original appointment: 11/22    Additional notes: PLEASE CALL ONCE R/S.

## 2022-08-23 NOTE — TELEPHONE ENCOUNTER
Return call to Will.  Will reports that [facility] took him off of his methotrexate because there were no results from taking.  Will remain off of anything until after Sept 15th then will start either a clinical trial or another treatment.  Appointment made so that Will can discuss with Dr Alcantara about his opinion.

## 2022-09-01 ENCOUNTER — LAB (OUTPATIENT)
Dept: LAB | Facility: HOSPITAL | Age: 67
End: 2022-09-01

## 2022-09-01 ENCOUNTER — TRANSCRIBE ORDERS (OUTPATIENT)
Dept: LAB | Facility: HOSPITAL | Age: 67
End: 2022-09-01

## 2022-09-01 DIAGNOSIS — C91.Z0 ALEUKEMIC LYMPHOCYTIC LEUKEMIA: ICD-10-CM

## 2022-09-01 DIAGNOSIS — C91.Z0 ALEUKEMIC LYMPHOCYTIC LEUKEMIA: Primary | ICD-10-CM

## 2022-09-01 LAB
ALBUMIN SERPL-MCNC: 4 G/DL (ref 3.5–5.2)
ALBUMIN/GLOB SERPL: 1.7 G/DL
ALP SERPL-CCNC: 81 U/L (ref 39–117)
ALT SERPL W P-5'-P-CCNC: 15 U/L (ref 1–41)
ANION GAP SERPL CALCULATED.3IONS-SCNC: 7.8 MMOL/L (ref 5–15)
ANISOCYTOSIS BLD QL: ABNORMAL
AST SERPL-CCNC: 19 U/L (ref 1–40)
BASOPHILS # BLD MANUAL: 0.01 10*3/MM3 (ref 0–0.2)
BASOPHILS NFR BLD MANUAL: 1 % (ref 0–1.5)
BILIRUB SERPL-MCNC: 1 MG/DL (ref 0–1.2)
BUN SERPL-MCNC: 15 MG/DL (ref 8–23)
BUN/CREAT SERPL: 15.3 (ref 7–25)
BURR CELLS BLD QL SMEAR: ABNORMAL
CALCIUM SPEC-SCNC: 9.2 MG/DL (ref 8.6–10.5)
CHLORIDE SERPL-SCNC: 103 MMOL/L (ref 98–107)
CO2 SERPL-SCNC: 27.2 MMOL/L (ref 22–29)
CREAT SERPL-MCNC: 0.98 MG/DL (ref 0.76–1.27)
DEPRECATED RDW RBC AUTO: 52.7 FL (ref 37–54)
EGFRCR SERPLBLD CKD-EPI 2021: 84 ML/MIN/1.73
ERYTHROCYTE [DISTWIDTH] IN BLOOD BY AUTOMATED COUNT: 14.8 % (ref 12.3–15.4)
GLOBULIN UR ELPH-MCNC: 2.4 GM/DL
GLUCOSE SERPL-MCNC: 152 MG/DL (ref 65–99)
HCT VFR BLD AUTO: 37.1 % (ref 37.5–51)
HGB BLD-MCNC: 12.4 G/DL (ref 13–17.7)
LYMPHOCYTES # BLD MANUAL: 0.33 10*3/MM3 (ref 0.7–3.1)
LYMPHOCYTES NFR BLD MANUAL: 4.1 % (ref 5–12)
MACROCYTES BLD QL SMEAR: ABNORMAL
MCH RBC QN AUTO: 32.7 PG (ref 26.6–33)
MCHC RBC AUTO-ENTMCNC: 33.4 G/DL (ref 31.5–35.7)
MCV RBC AUTO: 97.9 FL (ref 79–97)
MONOCYTES # BLD: 0.04 10*3/MM3 (ref 0.1–0.9)
NEUTROPHILS # BLD AUTO: 0.57 10*3/MM3 (ref 1.7–7)
NEUTROPHILS NFR BLD MANUAL: 60.2 % (ref 42.7–76)
PLAT MORPH BLD: NORMAL
PLATELET # BLD AUTO: 257 10*3/MM3 (ref 140–450)
PMV BLD AUTO: 10.5 FL (ref 6–12)
POIKILOCYTOSIS BLD QL SMEAR: ABNORMAL
POTASSIUM SERPL-SCNC: 4.3 MMOL/L (ref 3.5–5.2)
PROT SERPL-MCNC: 6.4 G/DL (ref 6–8.5)
RBC # BLD AUTO: 3.79 10*6/MM3 (ref 4.14–5.8)
SODIUM SERPL-SCNC: 138 MMOL/L (ref 136–145)
VARIANT LYMPHS NFR BLD MANUAL: 34.7 % (ref 19.6–45.3)
WBC MORPH BLD: NORMAL
WBC NRBC COR # BLD: 0.95 10*3/MM3 (ref 3.4–10.8)

## 2022-09-01 PROCEDURE — 80053 COMPREHEN METABOLIC PANEL: CPT

## 2022-09-01 PROCEDURE — 85025 COMPLETE CBC W/AUTO DIFF WBC: CPT

## 2022-09-01 PROCEDURE — 85007 BL SMEAR W/DIFF WBC COUNT: CPT

## 2022-09-01 PROCEDURE — 36415 COLL VENOUS BLD VENIPUNCTURE: CPT

## 2022-09-19 ENCOUNTER — TRANSCRIBE ORDERS (OUTPATIENT)
Dept: LAB | Facility: HOSPITAL | Age: 67
End: 2022-09-19

## 2022-09-19 ENCOUNTER — LAB (OUTPATIENT)
Dept: LAB | Facility: HOSPITAL | Age: 67
End: 2022-09-19

## 2022-09-19 DIAGNOSIS — C91.Z0 ALEUKEMIC LYMPHOCYTIC LEUKEMIA: ICD-10-CM

## 2022-09-19 DIAGNOSIS — C91.Z0 ALEUKEMIC LYMPHOCYTIC LEUKEMIA: Primary | ICD-10-CM

## 2022-09-19 PROCEDURE — 80053 COMPREHEN METABOLIC PANEL: CPT

## 2022-09-19 PROCEDURE — 85025 COMPLETE CBC W/AUTO DIFF WBC: CPT

## 2022-09-19 PROCEDURE — 36415 COLL VENOUS BLD VENIPUNCTURE: CPT

## 2022-09-20 ENCOUNTER — OFFICE VISIT (OUTPATIENT)
Dept: ONCOLOGY | Facility: CLINIC | Age: 67
End: 2022-09-20

## 2022-09-20 VITALS
HEIGHT: 70 IN | WEIGHT: 199 LBS | RESPIRATION RATE: 18 BRPM | TEMPERATURE: 98.1 F | BODY MASS INDEX: 28.49 KG/M2 | HEART RATE: 86 BPM | OXYGEN SATURATION: 97 % | SYSTOLIC BLOOD PRESSURE: 143 MMHG | DIASTOLIC BLOOD PRESSURE: 85 MMHG

## 2022-09-20 DIAGNOSIS — C91.Z0 T-CELL LARGE GRANULAR LYMPHOCYTIC LEUKEMIA: Primary | ICD-10-CM

## 2022-09-20 LAB
ALBUMIN SERPL-MCNC: 3.9 G/DL (ref 3.5–5.2)
ALBUMIN/GLOB SERPL: 1.3 G/DL
ALP SERPL-CCNC: 72 U/L (ref 39–117)
ALT SERPL W P-5'-P-CCNC: 16 U/L (ref 1–41)
ANION GAP SERPL CALCULATED.3IONS-SCNC: 9.2 MMOL/L (ref 5–15)
AST SERPL-CCNC: 21 U/L (ref 1–40)
BASOPHILS # BLD AUTO: 0.01 10*3/MM3 (ref 0–0.2)
BASOPHILS NFR BLD AUTO: 1.1 % (ref 0–1.5)
BILIRUB SERPL-MCNC: 1.1 MG/DL (ref 0–1.2)
BUN SERPL-MCNC: 9 MG/DL (ref 8–23)
BUN/CREAT SERPL: 10.8 (ref 7–25)
CALCIUM SPEC-SCNC: 9.2 MG/DL (ref 8.6–10.5)
CHLORIDE SERPL-SCNC: 103 MMOL/L (ref 98–107)
CO2 SERPL-SCNC: 26.8 MMOL/L (ref 22–29)
CREAT SERPL-MCNC: 0.83 MG/DL (ref 0.76–1.27)
DEPRECATED RDW RBC AUTO: 54.4 FL (ref 37–54)
EGFRCR SERPLBLD CKD-EPI 2021: 95.3 ML/MIN/1.73
EOSINOPHIL # BLD AUTO: 0.02 10*3/MM3 (ref 0–0.4)
EOSINOPHIL NFR BLD AUTO: 2.2 % (ref 0.3–6.2)
ERYTHROCYTE [DISTWIDTH] IN BLOOD BY AUTOMATED COUNT: 15.2 % (ref 12.3–15.4)
GLOBULIN UR ELPH-MCNC: 3 GM/DL
GLUCOSE SERPL-MCNC: 88 MG/DL (ref 65–99)
HCT VFR BLD AUTO: 37.3 % (ref 37.5–51)
HGB BLD-MCNC: 12.5 G/DL (ref 13–17.7)
LYMPHOCYTES # BLD AUTO: 0.22 10*3/MM3 (ref 0.7–3.1)
LYMPHOCYTES NFR BLD AUTO: 24.7 % (ref 19.6–45.3)
MCH RBC QN AUTO: 32.7 PG (ref 26.6–33)
MCHC RBC AUTO-ENTMCNC: 33.5 G/DL (ref 31.5–35.7)
MCV RBC AUTO: 97.6 FL (ref 79–97)
MONOCYTES # BLD AUTO: 0.05 10*3/MM3 (ref 0.1–0.9)
MONOCYTES NFR BLD AUTO: 5.6 % (ref 5–12)
NEUTROPHILS NFR BLD AUTO: 0.59 10*3/MM3 (ref 1.7–7)
NEUTROPHILS NFR BLD AUTO: 66.4 % (ref 42.7–76)
PLATELET # BLD AUTO: 269 10*3/MM3 (ref 140–450)
PMV BLD AUTO: 9.9 FL (ref 6–12)
POTASSIUM SERPL-SCNC: 4 MMOL/L (ref 3.5–5.2)
PROT SERPL-MCNC: 6.9 G/DL (ref 6–8.5)
RBC # BLD AUTO: 3.82 10*6/MM3 (ref 4.14–5.8)
SODIUM SERPL-SCNC: 139 MMOL/L (ref 136–145)
WBC NRBC COR # BLD: 0.89 10*3/MM3 (ref 3.4–10.8)

## 2022-09-20 PROCEDURE — 99214 OFFICE O/P EST MOD 30 MIN: CPT | Performed by: INTERNAL MEDICINE

## 2022-09-20 RX ORDER — ACYCLOVIR 800 MG/1
800 TABLET ORAL 2 TIMES DAILY
Qty: 60 TABLET | Refills: 1 | Status: SHIPPED | OUTPATIENT
Start: 2022-09-20 | End: 2022-10-13

## 2022-09-20 NOTE — PROGRESS NOTES
Follow Up Office Visit      Date: 2022     Patient Name: Jose Wells  MRN: [id number]  : 1955  Referring Physician: Kiya Grossman     Chief Complaint:  Follow-up for neutropenia     History of Present Illness: Jose Wells is a pleasant 65 y.o. male past medical history of hyperlipidemia and hypertension who presents today for evaluation of neutropenia. The patient is accompanied by their wife who contributes to the history of their care.  Patient states that over the past couple weeks he has been feeling more fatigued.  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  Denies any unexplained fevers, infections, night sweats, weight loss.  He was seen by his PCP who checked a CBC which was notable for WBC of 1.1 with an ANC of 0.6.  Hemoglobin and platelet count were within normal limits.  He denies any family history of any leukemias.  Does note that him and his wife recently traveled to [place].  States that during the trip he was taking excessive dose of a vitamin C multivitamin.  Has continued to take a multivitamin since being home.  Per chart review he had a WBC of 4.8 in 2019 with an ANC of 3400 at that time.  He otherwise feels well today and is compliant with his home medications     Interval History:  Presents to clinic for follow-up.  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    Has stopped methotrexate in 2022 as he was not having treatment response with his ANC is continuing to remain between 500-1000.  He denies any unexplained fevers, chills, night sweats, weight loss    Oncology History:    Oncology/Hematology History   T-cell large granular lymphocytic leukemia (HCC)   2022 Initial Diagnosis    T-cell large granular lymphocytic leukemia (HCC)     2022 -  Chemotherapy    OP TIXAGEVIMAB / CILGAVIMAB (CHEL) (PHARMACIST USE) (WITHOUT  CRITERIA QUESTIONS)         Subjective      Review of Systems:   Constitutional: Negative for fevers, chills, or weight loss  Eyes: Negative for blurred vision or discharge         Ear/Nose/Throat: Negative for difficulty swallowing, sore throat, LAD                                                       Respiratory: Negative for cough, SOA, wheezing                                                                                        Cardiovascular: Negative for chest pain or palpitations                                                                  Gastrointestinal: Negative for nausea, vomiting or diarrhea                                                                     Genitourinary: Negative for dysuria or hematuria                                                                                           Musculoskeletal: Negative for any joint pains or muscle aches                                                                        Neurologic: Negative for any weakness, headaches, dizziness                                                                         Hematologic: Negative for any easy bleeding or bruising                                                                                   Psychiatric: Negative for anxiety or depression                          Past Medical History/Past Surgical History/ Family History/ Social History: Reviewed by me and unchanged from my previous documentation done on May 2022.     Medications:     Current Outpatient Medications:   •  Cyanocobalamin (VITAMIN B 12 PO), Take  by mouth., Disp: , Rfl:   •  ketoconazole (NIZORAL) 2 % shampoo, SHAMPOO DAILY AS NEEDED, Disp: , Rfl:   •  triamcinolone (KENALOG) 0.1 % ointment, APPLY THIN LAYER TWICE DAILY X 1-2 WEEKS TO AFFECTED AREA. NO MORE THAN 2 WEEKS AT A TIME, Disp: , Rfl:   •  vitamin D (ERGOCALCIFEROL) 1.25 MG (20433 UT) capsule capsule, TAKE 1 CAPSULE BY MOUTH EVERY 7 DAYS., Disp: 4 capsule, Rfl: 6  •   "acyclovir (ZOVIRAX) 800 MG tablet, Take 1 tablet by mouth 2 (Two) Times a Day., Disp: 60 tablet, Rfl: 1  •  folic acid (FOLVITE) 1 MG tablet, TAKE 1 TABLET BY MOUTH ON DAYS WHEN METHOTREXATE IS NOT TAKEN., Disp: , Rfl:     Allergies:   Allergies   Allergen Reactions   • Levofloxacin Swelling     Swelling of tendons       Objective     Physical Exam:  Vital Signs:   Vitals:    09/20/22 1006   BP: 143/85   Pulse: 86   Resp: 18   Temp: 98.1 °F (36.7 °C)   SpO2: 97%   Weight: 90.3 kg (199 lb)   Height: 177.8 cm (70\")   PainSc: 0-No pain     Pain Score    09/20/22 1006   PainSc: 0-No pain     ECOG Performance Status: 0 - Asymptomatic    Constitutional: NAD, ECOG 0  Eyes: PERRLA, scleral anicteric  ENT: No LAD, no thyromegaly  Respiratory: CTAB, no wheezing, rales, rhonchi  Cardiovascular: RRR, no murmurs, pulses 2+ bilaterally  Abdomen: soft, NT/ND, no HSM  Musculoskeletal: strength 5/5 bilaterally, no c/c/e  Neurologic: A&O x 3, CN II-XII intact grossly    Results Review:   Lab on 09/19/2022   Component Date Value Ref Range Status   • WBC 09/19/2022 0.89 (A) 3.40 - 10.80 10*3/mm3 Final   • RBC 09/19/2022 3.82 (A) 4.14 - 5.80 10*6/mm3 Final   • Hemoglobin 09/19/2022 12.5 (A) 13.0 - 17.7 g/dL Final   • Hematocrit 09/19/2022 37.3 (A) 37.5 - 51.0 % Final   • MCV 09/19/2022 97.6 (A) 79.0 - 97.0 fL Final   • MCH 09/19/2022 32.7  26.6 - 33.0 pg Final   • MCHC 09/19/2022 33.5  31.5 - 35.7 g/dL Final   • RDW 09/19/2022 15.2  12.3 - 15.4 % Final   • RDW-SD 09/19/2022 54.4 (A) 37.0 - 54.0 fl Final   • MPV 09/19/2022 9.9  6.0 - 12.0 fL Final   • Platelets 09/19/2022 269  140 - 450 10*3/mm3 Final   • Neutrophil % 09/19/2022 66.4  42.7 - 76.0 % Final   • Lymphocyte % 09/19/2022 24.7  19.6 - 45.3 % Final   • Monocyte % 09/19/2022 5.6  5.0 - 12.0 % Final   • Eosinophil % 09/19/2022 2.2  0.3 - 6.2 % Final   • Basophil % 09/19/2022 1.1  0.0 - 1.5 % Final   • Neutrophils, Absolute 09/19/2022 0.59 (A) 1.70 - 7.00 10*3/mm3 Final   • " Lymphocytes, Absolute 09/19/2022 0.22 (A) 0.70 - 3.10 10*3/mm3 Final   • Monocytes, Absolute 09/19/2022 0.05 (A) 0.10 - 0.90 10*3/mm3 Final   • Eosinophils, Absolute 09/19/2022 0.02  0.00 - 0.40 10*3/mm3 Final   • Basophils, Absolute 09/19/2022 0.01  0.00 - 0.20 10*3/mm3 Final   • Glucose 09/19/2022 88  65 - 99 mg/dL Final   • BUN 09/19/2022 9  8 - 23 mg/dL Final   • Creatinine 09/19/2022 0.83  0.76 - 1.27 mg/dL Final   • Sodium 09/19/2022 139  136 - 145 mmol/L Final   • Potassium 09/19/2022 4.0  3.5 - 5.2 mmol/L Final   • Chloride 09/19/2022 103  98 - 107 mmol/L Final   • CO2 09/19/2022 26.8  22.0 - 29.0 mmol/L Final   • Calcium 09/19/2022 9.2  8.6 - 10.5 mg/dL Final   • Total Protein 09/19/2022 6.9  6.0 - 8.5 g/dL Final   • Albumin 09/19/2022 3.90  3.50 - 5.20 g/dL Final   • ALT (SGPT) 09/19/2022 16  1 - 41 U/L Final   • AST (SGOT) 09/19/2022 21  1 - 40 U/L Final   • Alkaline Phosphatase 09/19/2022 72  39 - 117 U/L Final   • Total Bilirubin 09/19/2022 1.1  0.0 - 1.2 mg/dL Final   • Globulin 09/19/2022 3.0  gm/dL Final   • A/G Ratio 09/19/2022 1.3  g/dL Final   • BUN/Creatinine Ratio 09/19/2022 10.8  7.0 - 25.0 Final   • Anion Gap 09/19/2022 9.2  5.0 - 15.0 mmol/L Final   • eGFR 09/19/2022 95.3  >60.0 mL/min/1.73 Final    National Kidney Foundation and American Society of Nephrology (ASN) Task Force recommended calculation based on the Chronic Kidney Disease Epidemiology Collaboration (CKD-EPI) equation refit without adjustment for race.       No results found.    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  Per chart review is WBC was 4.8 in April 2019 with an ANC of 3400  -WBC 1.26 in July 2021.  ANC 0.76  -Platelet count and Hgb within normal limits  -LDH within normal limits, peripheral smear showing no immature cells or blast  -Vitamin B12, folate, copper, zinc levels within normal  limits.  Vitamin D has previously been deficient  -Status post bone marrow biopsy with aspirate on 7/26/2021  -Pathology nonconcerning for an acute malignancy or MDS.  Most notable for a reactive/regenerative process  -Iron studies within normal limits, hepatitis panel, EBV negative  -Repeat CBC 0.9 and ANC 0.43 in December 2021.  -Repeat bone marrow biopsy in December 2021 without evidence of an acute malignancy  -Patient seen at [facility] where he had a positive TCR gene rearrangement study with next-generation sequencing showing a STAT3 mutation indicative of T-cell-LGL  -Started on weekly methotrexate and tolerating well  -Patient showing no improvement in his labs after about 8-9 months of methotrexate treatment.  Treatment discontinued in August 2022  -Per [facility] notes, plan to either initiate a clinical trial with CC-486 (oral azacitidine) versus starting oral cyclophosphamide  -Advised patient that should he qualify for the clinical trial that he should pursue enrollment  -Should patient not be able to be enrolled on his clinical trial, would start cyclophosphamide which we can prescribe here     2.  Vitamin D deficiency  -Noted to be low on lab check in July 2021  -Continue vitamin D replacement     3.  Vitamin B12 deficiency  -Stable on replacement     4.  Health maintenance  -Status post Evusheld in May 2022  -Okay for COVID booster and flu shot in the fall  -Refilled prophylactic acyclovir today         Follow Up:   Follow-up in 4 months or sooner if needed     Judah Alcantara MD  Hematology and Oncology     Please note that portions of this note may have been completed with a voice recognition program. Efforts were made to edit the dictations, but occasionally words are mistranscribed.

## 2022-09-22 ENCOUNTER — TRANSCRIBE ORDERS (OUTPATIENT)
Dept: ADMINISTRATIVE | Facility: HOSPITAL | Age: 67
End: 2022-09-22

## 2022-09-22 ENCOUNTER — HOSPITAL ENCOUNTER (OUTPATIENT)
Dept: GENERAL RADIOLOGY | Facility: HOSPITAL | Age: 67
Discharge: HOME | End: 2022-09-22
Admitting: INTERNAL MEDICINE

## 2022-09-22 DIAGNOSIS — M54.50 LOW BACK PAIN, UNSPECIFIED BACK PAIN LATERALITY, UNSPECIFIED CHRONICITY, UNSPECIFIED WHETHER SCIATICA PRESENT: Primary | ICD-10-CM

## 2022-09-22 DIAGNOSIS — M54.50 LOW BACK PAIN, UNSPECIFIED BACK PAIN LATERALITY, UNSPECIFIED CHRONICITY, UNSPECIFIED WHETHER SCIATICA PRESENT: ICD-10-CM

## 2022-09-22 PROCEDURE — 72110 X-RAY EXAM L-2 SPINE 4/>VWS: CPT

## 2022-10-03 ENCOUNTER — LAB (OUTPATIENT)
Dept: LAB | Facility: HOSPITAL | Age: 67
End: 2022-10-03

## 2022-10-03 ENCOUNTER — TRANSCRIBE ORDERS (OUTPATIENT)
Dept: LAB | Facility: HOSPITAL | Age: 67
End: 2022-10-03

## 2022-10-03 DIAGNOSIS — C91.Z0 ALEUKEMIC LYMPHOCYTIC LEUKEMIA: Primary | ICD-10-CM

## 2022-10-03 DIAGNOSIS — C91.Z0 ALEUKEMIC LYMPHOCYTIC LEUKEMIA: ICD-10-CM

## 2022-10-03 LAB
DEPRECATED RDW RBC AUTO: 48.9 FL (ref 37–54)
ERYTHROCYTE [DISTWIDTH] IN BLOOD BY AUTOMATED COUNT: 14.5 % (ref 12.3–15.4)
HCT VFR BLD AUTO: 34.4 % (ref 37.5–51)
HGB BLD-MCNC: 12 G/DL (ref 13–17.7)
MCH RBC QN AUTO: 32 PG (ref 26.6–33)
MCHC RBC AUTO-ENTMCNC: 34.9 G/DL (ref 31.5–35.7)
MCV RBC AUTO: 91.7 FL (ref 79–97)
PLATELET # BLD AUTO: 353 10*3/MM3 (ref 140–450)
PMV BLD AUTO: 9.7 FL (ref 6–12)
RBC # BLD AUTO: 3.75 10*6/MM3 (ref 4.14–5.8)
WBC NRBC COR # BLD: 1.09 10*3/MM3 (ref 3.4–10.8)

## 2022-10-03 PROCEDURE — 85025 COMPLETE CBC W/AUTO DIFF WBC: CPT

## 2022-10-03 PROCEDURE — 85007 BL SMEAR W/DIFF WBC COUNT: CPT

## 2022-10-03 PROCEDURE — 36415 COLL VENOUS BLD VENIPUNCTURE: CPT

## 2022-10-03 PROCEDURE — 80053 COMPREHEN METABOLIC PANEL: CPT

## 2022-10-04 LAB
ALBUMIN SERPL-MCNC: 3.9 G/DL (ref 3.5–5.2)
ALBUMIN/GLOB SERPL: 1.6 G/DL
ALP SERPL-CCNC: 66 U/L (ref 39–117)
ALT SERPL W P-5'-P-CCNC: 25 U/L (ref 1–41)
ANION GAP SERPL CALCULATED.3IONS-SCNC: 11 MMOL/L (ref 5–15)
AST SERPL-CCNC: 26 U/L (ref 1–40)
BASOPHILS # BLD MANUAL: 0.01 10*3/MM3 (ref 0–0.2)
BASOPHILS NFR BLD MANUAL: 1 % (ref 0–1.5)
BILIRUB SERPL-MCNC: 0.7 MG/DL (ref 0–1.2)
BUN SERPL-MCNC: 19 MG/DL (ref 8–23)
BUN/CREAT SERPL: 23.5 (ref 7–25)
CALCIUM SPEC-SCNC: 8.7 MG/DL (ref 8.6–10.5)
CHLORIDE SERPL-SCNC: 104 MMOL/L (ref 98–107)
CO2 SERPL-SCNC: 28 MMOL/L (ref 22–29)
CREAT SERPL-MCNC: 0.81 MG/DL (ref 0.76–1.27)
EGFRCR SERPLBLD CKD-EPI 2021: 96 ML/MIN/1.73
EOSINOPHIL # BLD MANUAL: 0.01 10*3/MM3 (ref 0–0.4)
EOSINOPHIL NFR BLD MANUAL: 1 % (ref 0.3–6.2)
GLOBULIN UR ELPH-MCNC: 2.5 GM/DL
GLUCOSE SERPL-MCNC: 80 MG/DL (ref 65–99)
LYMPHOCYTES # BLD MANUAL: 0.68 10*3/MM3 (ref 0.7–3.1)
LYMPHOCYTES NFR BLD MANUAL: 1 % (ref 5–12)
MONOCYTES # BLD: 0.01 10*3/MM3 (ref 0.1–0.9)
NEUTROPHILS # BLD AUTO: 0.38 10*3/MM3 (ref 1.7–7)
NEUTROPHILS NFR BLD MANUAL: 35 % (ref 42.7–76)
PLAT MORPH BLD: NORMAL
POIKILOCYTOSIS BLD QL SMEAR: ABNORMAL
POTASSIUM SERPL-SCNC: 3.8 MMOL/L (ref 3.5–5.2)
PROT SERPL-MCNC: 6.4 G/DL (ref 6–8.5)
SODIUM SERPL-SCNC: 143 MMOL/L (ref 136–145)
VARIANT LYMPHS NFR BLD MANUAL: 62 % (ref 19.6–45.3)
WBC MORPH BLD: NORMAL

## 2022-10-12 DIAGNOSIS — C91.Z0 T-CELL LARGE GRANULAR LYMPHOCYTIC LEUKEMIA: ICD-10-CM

## 2022-10-13 RX ORDER — ACYCLOVIR 800 MG/1
TABLET ORAL
Qty: 60 TABLET | Refills: 1 | Status: SHIPPED | OUTPATIENT
Start: 2022-10-13 | End: 2022-12-14

## 2022-10-18 ENCOUNTER — TRANSCRIBE ORDERS (OUTPATIENT)
Dept: LAB | Facility: HOSPITAL | Age: 67
End: 2022-10-18

## 2022-10-18 ENCOUNTER — LAB (OUTPATIENT)
Dept: LAB | Facility: HOSPITAL | Age: 67
End: 2022-10-18

## 2022-10-18 DIAGNOSIS — C91.Z0 ALEUKEMIC LYMPHOCYTIC LEUKEMIA: ICD-10-CM

## 2022-10-18 DIAGNOSIS — C91.Z0 ALEUKEMIC LYMPHOCYTIC LEUKEMIA: Primary | ICD-10-CM

## 2022-10-18 LAB
ALBUMIN SERPL-MCNC: 4.4 G/DL (ref 3.5–5.2)
ALBUMIN/GLOB SERPL: 1.9 G/DL
ALP SERPL-CCNC: 72 U/L (ref 39–117)
ALT SERPL W P-5'-P-CCNC: 15 U/L (ref 1–41)
ANION GAP SERPL CALCULATED.3IONS-SCNC: 9 MMOL/L (ref 5–15)
AST SERPL-CCNC: 22 U/L (ref 1–40)
BASOPHILS # BLD AUTO: 0.01 10*3/MM3 (ref 0–0.2)
BASOPHILS NFR BLD AUTO: 0.5 % (ref 0–1.5)
BILIRUB SERPL-MCNC: 1 MG/DL (ref 0–1.2)
BUN SERPL-MCNC: 12 MG/DL (ref 8–23)
BUN/CREAT SERPL: 13.6 (ref 7–25)
CALCIUM SPEC-SCNC: 9.3 MG/DL (ref 8.6–10.5)
CHLORIDE SERPL-SCNC: 104 MMOL/L (ref 98–107)
CO2 SERPL-SCNC: 28 MMOL/L (ref 22–29)
CREAT SERPL-MCNC: 0.88 MG/DL (ref 0.76–1.27)
DEPRECATED RDW RBC AUTO: 51.7 FL (ref 37–54)
EGFRCR SERPLBLD CKD-EPI 2021: 93.7 ML/MIN/1.73
EOSINOPHIL # BLD AUTO: 0.02 10*3/MM3 (ref 0–0.4)
EOSINOPHIL NFR BLD AUTO: 1.1 % (ref 0.3–6.2)
ERYTHROCYTE [DISTWIDTH] IN BLOOD BY AUTOMATED COUNT: 14.8 % (ref 12.3–15.4)
GLOBULIN UR ELPH-MCNC: 2.3 GM/DL
GLUCOSE SERPL-MCNC: 134 MG/DL (ref 65–99)
HCT VFR BLD AUTO: 38.1 % (ref 37.5–51)
HGB BLD-MCNC: 13 G/DL (ref 13–17.7)
IMM GRANULOCYTES # BLD AUTO: 0.01 10*3/MM3 (ref 0–0.05)
IMM GRANULOCYTES NFR BLD AUTO: 0.5 % (ref 0–0.5)
LYMPHOCYTES # BLD AUTO: 0.4 10*3/MM3 (ref 0.7–3.1)
LYMPHOCYTES NFR BLD AUTO: 21.4 % (ref 19.6–45.3)
MCH RBC QN AUTO: 32.6 PG (ref 26.6–33)
MCHC RBC AUTO-ENTMCNC: 34.1 G/DL (ref 31.5–35.7)
MCV RBC AUTO: 95.5 FL (ref 79–97)
MONOCYTES # BLD AUTO: 0.1 10*3/MM3 (ref 0.1–0.9)
MONOCYTES NFR BLD AUTO: 5.3 % (ref 5–12)
NEUTROPHILS NFR BLD AUTO: 1.33 10*3/MM3 (ref 1.7–7)
NEUTROPHILS NFR BLD AUTO: 71.2 % (ref 42.7–76)
NRBC BLD AUTO-RTO: 0 /100 WBC (ref 0–0.2)
PLATELET # BLD AUTO: 281 10*3/MM3 (ref 140–450)
PMV BLD AUTO: 10.3 FL (ref 6–12)
POTASSIUM SERPL-SCNC: 4 MMOL/L (ref 3.5–5.2)
PROT SERPL-MCNC: 6.7 G/DL (ref 6–8.5)
RBC # BLD AUTO: 3.99 10*6/MM3 (ref 4.14–5.8)
SODIUM SERPL-SCNC: 141 MMOL/L (ref 136–145)
WBC NRBC COR # BLD: 1.87 10*3/MM3 (ref 3.4–10.8)

## 2022-10-18 PROCEDURE — 36415 COLL VENOUS BLD VENIPUNCTURE: CPT

## 2022-10-18 PROCEDURE — 85025 COMPLETE CBC W/AUTO DIFF WBC: CPT

## 2022-10-18 PROCEDURE — 80053 COMPREHEN METABOLIC PANEL: CPT

## 2022-11-01 ENCOUNTER — TELEPHONE (OUTPATIENT)
Dept: ONCOLOGY | Facility: CLINIC | Age: 67
End: 2022-11-01

## 2022-11-01 NOTE — TELEPHONE ENCOUNTER
Caller: Jose Wells    Relationship: Self    Best call back number: [phone]      What was the call regarding: PATIENT WANTED TO KNOW IF HE COULD SCHEDULE TO GET THE BOOSTER EVU SHEILD      Do you require a callback: YES

## 2022-11-01 NOTE — TELEPHONE ENCOUNTER
Return call to Jose.  Notified Jose that I have requested an appointment with our infusion scheduling for him to be vaccinated.  Scheduling should call him back with appointment date and time.  Jose states understood

## 2022-11-04 DIAGNOSIS — C91.Z0 T-CELL LARGE GRANULAR LYMPHOCYTIC LEUKEMIA: ICD-10-CM

## 2022-11-04 RX ORDER — ACYCLOVIR 800 MG/1
TABLET ORAL
Qty: 60 TABLET | Refills: 1 | OUTPATIENT
Start: 2022-11-04

## 2022-11-21 RX ORDER — ERGOCALCIFEROL 1.25 MG/1
CAPSULE ORAL
Qty: 12 CAPSULE | Refills: 2 | Status: SHIPPED | OUTPATIENT
Start: 2022-11-21

## 2022-11-29 ENCOUNTER — HOSPITAL ENCOUNTER (OUTPATIENT)
Dept: ONCOLOGY | Facility: HOSPITAL | Age: 67
Setting detail: INFUSION SERIES
Discharge: HOME | End: 2022-11-29

## 2022-11-29 VITALS
BODY MASS INDEX: 28.49 KG/M2 | HEIGHT: 70 IN | WEIGHT: 199 LBS | TEMPERATURE: 97.5 F | RESPIRATION RATE: 16 BRPM | SYSTOLIC BLOOD PRESSURE: 166 MMHG | HEART RATE: 81 BPM | DIASTOLIC BLOOD PRESSURE: 84 MMHG

## 2022-11-29 DIAGNOSIS — D84.9 IMMUNOCOMPROMISED: Primary | ICD-10-CM

## 2022-11-29 DIAGNOSIS — C91.Z0 T-CELL LARGE GRANULAR LYMPHOCYTIC LEUKEMIA: ICD-10-CM

## 2022-11-29 PROCEDURE — 96372 THER/PROPH/DIAG INJ SC/IM: CPT

## 2022-11-29 PROCEDURE — M0220 HC INJECTION, TIXAGEVIMAB AND CILGAVIMAB: HCPCS | Performed by: INTERNAL MEDICINE

## 2022-11-29 PROCEDURE — 25010000002 INJECTION, TIXAGEVIMAB AND CILGAVIMAB,: Performed by: INTERNAL MEDICINE

## 2022-11-29 RX ORDER — DIPHENHYDRAMINE HCL 50 MG
50 CAPSULE ORAL ONCE AS NEEDED
Status: CANCELLED | OUTPATIENT
Start: 2022-11-29

## 2022-11-29 RX ORDER — CYCLOPHOSPHAMIDE 25 MG/1
50 CAPSULE ORAL
COMMUNITY
End: 2023-02-17

## 2022-11-29 RX ORDER — EPINEPHRINE 1 MG/ML
0.3 INJECTION, SOLUTION, CONCENTRATE INTRAVENOUS AS NEEDED
Status: CANCELLED | OUTPATIENT
Start: 2022-11-29

## 2022-11-29 RX ORDER — EPINEPHRINE 1 MG/ML
0.3 INJECTION, SOLUTION, CONCENTRATE INTRAVENOUS AS NEEDED
Status: DISCONTINUED | OUTPATIENT
Start: 2022-11-29 | End: 2022-11-30 | Stop reason: HOSPADM

## 2022-11-29 RX ORDER — DIPHENHYDRAMINE HCL 50 MG
50 CAPSULE ORAL ONCE AS NEEDED
Status: DISCONTINUED | OUTPATIENT
Start: 2022-11-29 | End: 2022-11-30 | Stop reason: HOSPADM

## 2022-11-29 RX ADMIN — AZD7442 300 MG: KIT at 15:46

## 2022-11-30 ENCOUNTER — TRANSCRIBE ORDERS (OUTPATIENT)
Dept: LAB | Facility: HOSPITAL | Age: 67
End: 2022-11-30

## 2022-11-30 ENCOUNTER — LAB (OUTPATIENT)
Dept: LAB | Facility: HOSPITAL | Age: 67
End: 2022-11-30

## 2022-11-30 DIAGNOSIS — C91.Z0 ALEUKEMIC LYMPHOCYTIC LEUKEMIA: Primary | ICD-10-CM

## 2022-11-30 DIAGNOSIS — C91.Z0 ALEUKEMIC LYMPHOCYTIC LEUKEMIA: ICD-10-CM

## 2022-11-30 PROCEDURE — 80053 COMPREHEN METABOLIC PANEL: CPT

## 2022-11-30 PROCEDURE — 85007 BL SMEAR W/DIFF WBC COUNT: CPT

## 2022-11-30 PROCEDURE — 85025 COMPLETE CBC W/AUTO DIFF WBC: CPT

## 2022-11-30 PROCEDURE — 36415 COLL VENOUS BLD VENIPUNCTURE: CPT

## 2022-12-01 LAB
ALBUMIN SERPL-MCNC: 4 G/DL (ref 3.5–5.2)
ALBUMIN/GLOB SERPL: 1.5 G/DL
ALP SERPL-CCNC: 78 U/L (ref 39–117)
ALT SERPL W P-5'-P-CCNC: 16 U/L (ref 1–41)
ANION GAP SERPL CALCULATED.3IONS-SCNC: 9.1 MMOL/L (ref 5–15)
ANISOCYTOSIS BLD QL: ABNORMAL
AST SERPL-CCNC: 20 U/L (ref 1–40)
BILIRUB SERPL-MCNC: 0.7 MG/DL (ref 0–1.2)
BUN SERPL-MCNC: 15 MG/DL (ref 8–23)
BUN/CREAT SERPL: 16.7 (ref 7–25)
CALCIUM SPEC-SCNC: 9.5 MG/DL (ref 8.6–10.5)
CHLORIDE SERPL-SCNC: 103 MMOL/L (ref 98–107)
CO2 SERPL-SCNC: 27.9 MMOL/L (ref 22–29)
CREAT SERPL-MCNC: 0.9 MG/DL (ref 0.76–1.27)
DEPRECATED RDW RBC AUTO: 51.2 FL (ref 37–54)
EGFRCR SERPLBLD CKD-EPI 2021: 93 ML/MIN/1.73
EOSINOPHIL # BLD MANUAL: 0.02 10*3/MM3 (ref 0–0.4)
EOSINOPHIL NFR BLD MANUAL: 3.1 % (ref 0.3–6.2)
ERYTHROCYTE [DISTWIDTH] IN BLOOD BY AUTOMATED COUNT: 15.4 % (ref 12.3–15.4)
GLOBULIN UR ELPH-MCNC: 2.6 GM/DL
GLUCOSE SERPL-MCNC: 88 MG/DL (ref 65–99)
HCT VFR BLD AUTO: 35.7 % (ref 37.5–51)
HGB BLD-MCNC: 12.3 G/DL (ref 13–17.7)
LYMPHOCYTES # BLD MANUAL: 0.43 10*3/MM3 (ref 0.7–3.1)
MCH RBC QN AUTO: 31.8 PG (ref 26.6–33)
MCHC RBC AUTO-ENTMCNC: 34.5 G/DL (ref 31.5–35.7)
MCV RBC AUTO: 92.2 FL (ref 79–97)
NEUTROPHILS # BLD AUTO: 0.2 10*3/MM3 (ref 1.7–7)
NEUTROPHILS NFR BLD MANUAL: 31.3 % (ref 42.7–76)
PLAT MORPH BLD: NORMAL
PLATELET # BLD AUTO: 244 10*3/MM3 (ref 140–450)
PMV BLD AUTO: 10.4 FL (ref 6–12)
POIKILOCYTOSIS BLD QL SMEAR: ABNORMAL
POTASSIUM SERPL-SCNC: 4 MMOL/L (ref 3.5–5.2)
PROT SERPL-MCNC: 6.6 G/DL (ref 6–8.5)
RBC # BLD AUTO: 3.87 10*6/MM3 (ref 4.14–5.8)
SODIUM SERPL-SCNC: 140 MMOL/L (ref 136–145)
VARIANT LYMPHS NFR BLD MANUAL: 65.6 % (ref 19.6–45.3)
WBC MORPH BLD: NORMAL
WBC NRBC COR # BLD: 0.65 10*3/MM3 (ref 3.4–10.8)

## 2022-12-14 DIAGNOSIS — C91.Z0 T-CELL LARGE GRANULAR LYMPHOCYTIC LEUKEMIA: ICD-10-CM

## 2022-12-14 RX ORDER — ACYCLOVIR 800 MG/1
TABLET ORAL
Qty: 60 TABLET | Refills: 1 | Status: SHIPPED | OUTPATIENT
Start: 2022-12-14 | End: 2023-01-09

## 2022-12-28 ENCOUNTER — LAB (OUTPATIENT)
Dept: LAB | Facility: HOSPITAL | Age: 67
End: 2022-12-28

## 2022-12-28 DIAGNOSIS — C91.Z0 ALEUKEMIC LYMPHOCYTIC LEUKEMIA: Primary | ICD-10-CM

## 2022-12-28 LAB
ALBUMIN SERPL-MCNC: 4 G/DL (ref 3.5–5.2)
ALBUMIN/GLOB SERPL: 1.7 G/DL
ALP SERPL-CCNC: 76 U/L (ref 39–117)
ALT SERPL W P-5'-P-CCNC: 18 U/L (ref 1–41)
ANION GAP SERPL CALCULATED.3IONS-SCNC: 8 MMOL/L (ref 5–15)
AST SERPL-CCNC: 23 U/L (ref 1–40)
BASOPHILS # BLD AUTO: 0.01 10*3/MM3 (ref 0–0.2)
BASOPHILS NFR BLD AUTO: 0.9 % (ref 0–1.5)
BILIRUB SERPL-MCNC: 0.9 MG/DL (ref 0–1.2)
BUN SERPL-MCNC: 11 MG/DL (ref 8–23)
BUN/CREAT SERPL: 12.1 (ref 7–25)
CALCIUM SPEC-SCNC: 9.3 MG/DL (ref 8.6–10.5)
CHLORIDE SERPL-SCNC: 105 MMOL/L (ref 98–107)
CO2 SERPL-SCNC: 30 MMOL/L (ref 22–29)
CREAT SERPL-MCNC: 0.91 MG/DL (ref 0.76–1.27)
DEPRECATED RDW RBC AUTO: 54 FL (ref 37–54)
EGFRCR SERPLBLD CKD-EPI 2021: 91.2 ML/MIN/1.73
EOSINOPHIL # BLD AUTO: 0.03 10*3/MM3 (ref 0–0.4)
EOSINOPHIL NFR BLD AUTO: 2.7 % (ref 0.3–6.2)
ERYTHROCYTE [DISTWIDTH] IN BLOOD BY AUTOMATED COUNT: 16.2 % (ref 12.3–15.4)
GLOBULIN UR ELPH-MCNC: 2.3 GM/DL
GLUCOSE SERPL-MCNC: 107 MG/DL (ref 65–99)
HCT VFR BLD AUTO: 34.6 % (ref 37.5–51)
HGB BLD-MCNC: 12.3 G/DL (ref 13–17.7)
IMM GRANULOCYTES # BLD AUTO: 0 10*3/MM3 (ref 0–0.05)
IMM GRANULOCYTES NFR BLD AUTO: 0 % (ref 0–0.5)
LYMPHOCYTES # BLD AUTO: 0.25 10*3/MM3 (ref 0.7–3.1)
LYMPHOCYTES NFR BLD AUTO: 22.3 % (ref 19.6–45.3)
MCH RBC QN AUTO: 32.5 PG (ref 26.6–33)
MCHC RBC AUTO-ENTMCNC: 35.5 G/DL (ref 31.5–35.7)
MCV RBC AUTO: 91.3 FL (ref 79–97)
MONOCYTES # BLD AUTO: 0.05 10*3/MM3 (ref 0.1–0.9)
MONOCYTES NFR BLD AUTO: 4.5 % (ref 5–12)
NEUTROPHILS NFR BLD AUTO: 0.78 10*3/MM3 (ref 1.7–7)
NEUTROPHILS NFR BLD AUTO: 69.6 % (ref 42.7–76)
NRBC BLD AUTO-RTO: 0 /100 WBC (ref 0–0.2)
PLATELET # BLD AUTO: 275 10*3/MM3 (ref 140–450)
PMV BLD AUTO: 10 FL (ref 6–12)
POTASSIUM SERPL-SCNC: 3.8 MMOL/L (ref 3.5–5.2)
PROT SERPL-MCNC: 6.3 G/DL (ref 6–8.5)
RBC # BLD AUTO: 3.79 10*6/MM3 (ref 4.14–5.8)
SODIUM SERPL-SCNC: 143 MMOL/L (ref 136–145)
WBC NRBC COR # BLD: 1.12 10*3/MM3 (ref 3.4–10.8)

## 2022-12-28 PROCEDURE — 85025 COMPLETE CBC W/AUTO DIFF WBC: CPT

## 2022-12-28 PROCEDURE — 36415 COLL VENOUS BLD VENIPUNCTURE: CPT

## 2022-12-28 PROCEDURE — 80053 COMPREHEN METABOLIC PANEL: CPT

## 2023-01-09 DIAGNOSIS — C91.Z0 T-CELL LARGE GRANULAR LYMPHOCYTIC LEUKEMIA: ICD-10-CM

## 2023-01-09 RX ORDER — ACYCLOVIR 800 MG/1
TABLET ORAL
Qty: 60 TABLET | Refills: 1 | Status: SHIPPED | OUTPATIENT
Start: 2023-01-09 | End: 2023-02-02

## 2023-01-13 ENCOUNTER — TELEPHONE (OUTPATIENT)
Dept: ONCOLOGY | Facility: CLINIC | Age: 68
End: 2023-01-13
Payer: MEDICARE

## 2023-01-13 NOTE — TELEPHONE ENCOUNTER
Caller: Jose Wells    Relationship: Self    Best call back number: [phone]    What is the best time to reach you: ANYTIME     Who are you requesting to speak with (clinical staff, provider,  specific staff member): SCHEDULING     What was the call regarding: PT NEEDS TO SCHEDULE F/U 1/27 TO THE MIDDLE OF FEBRUARY PREFERABLY 2/15 OR AFTER      Do you require a callback: YES

## 2023-02-02 DIAGNOSIS — C91.Z0 T-CELL LARGE GRANULAR LYMPHOCYTIC LEUKEMIA: ICD-10-CM

## 2023-02-02 RX ORDER — ACYCLOVIR 800 MG/1
TABLET ORAL
Qty: 60 TABLET | Refills: 1 | Status: SHIPPED | OUTPATIENT
Start: 2023-02-02 | End: 2023-02-24

## 2023-02-09 ENCOUNTER — LAB (OUTPATIENT)
Dept: LAB | Facility: HOSPITAL | Age: 68
End: 2023-02-09
Payer: MEDICARE

## 2023-02-09 ENCOUNTER — TRANSCRIBE ORDERS (OUTPATIENT)
Dept: ADMINISTRATIVE | Facility: HOSPITAL | Age: 68
End: 2023-02-09
Payer: MEDICARE

## 2023-02-09 ENCOUNTER — TRANSCRIBE ORDERS (OUTPATIENT)
Dept: LAB | Facility: HOSPITAL | Age: 68
End: 2023-02-09
Payer: MEDICARE

## 2023-02-09 ENCOUNTER — HOSPITAL ENCOUNTER (OUTPATIENT)
Dept: GENERAL RADIOLOGY | Facility: HOSPITAL | Age: 68
Discharge: HOME | End: 2023-02-09
Payer: MEDICARE

## 2023-02-09 DIAGNOSIS — C91.Z0 ALEUKEMIC LYMPHOCYTIC LEUKEMIA: Primary | ICD-10-CM

## 2023-02-09 DIAGNOSIS — R05.3 CHRONIC COUGH: ICD-10-CM

## 2023-02-09 DIAGNOSIS — R05.3 CHRONIC COUGH: Primary | ICD-10-CM

## 2023-02-09 DIAGNOSIS — C91.Z0 ALEUKEMIC LYMPHOCYTIC LEUKEMIA: ICD-10-CM

## 2023-02-09 LAB
ALBUMIN SERPL-MCNC: 4.1 G/DL (ref 3.5–5.2)
ALBUMIN/GLOB SERPL: 1.7 G/DL
ALP SERPL-CCNC: 73 U/L (ref 39–117)
ALT SERPL W P-5'-P-CCNC: 17 U/L (ref 1–41)
ANION GAP SERPL CALCULATED.3IONS-SCNC: 7.6 MMOL/L (ref 5–15)
AST SERPL-CCNC: 25 U/L (ref 1–40)
BILIRUB SERPL-MCNC: 1.1 MG/DL (ref 0–1.2)
BUN SERPL-MCNC: 14 MG/DL (ref 8–23)
BUN/CREAT SERPL: 15.1 (ref 7–25)
CALCIUM SPEC-SCNC: 9.2 MG/DL (ref 8.6–10.5)
CHLORIDE SERPL-SCNC: 104 MMOL/L (ref 98–107)
CO2 SERPL-SCNC: 29.4 MMOL/L (ref 22–29)
CREAT SERPL-MCNC: 0.93 MG/DL (ref 0.76–1.27)
DEPRECATED RDW RBC AUTO: 51.8 FL (ref 37–54)
EGFRCR SERPLBLD CKD-EPI 2021: 88.9 ML/MIN/1.73
EOSINOPHIL # BLD MANUAL: 0.03 10*3/MM3 (ref 0–0.4)
EOSINOPHIL NFR BLD MANUAL: 4 % (ref 0.3–6.2)
ERYTHROCYTE [DISTWIDTH] IN BLOOD BY AUTOMATED COUNT: 15.5 % (ref 12.3–15.4)
GLOBULIN UR ELPH-MCNC: 2.4 GM/DL
GLUCOSE SERPL-MCNC: 137 MG/DL (ref 65–99)
HCT VFR BLD AUTO: 36 % (ref 37.5–51)
HGB BLD-MCNC: 12.8 G/DL (ref 13–17.7)
LYMPHOCYTES # BLD MANUAL: 0.15 10*3/MM3 (ref 0.7–3.1)
LYMPHOCYTES NFR BLD MANUAL: 8 % (ref 5–12)
MCH RBC QN AUTO: 33.2 PG (ref 26.6–33)
MCHC RBC AUTO-ENTMCNC: 35.6 G/DL (ref 31.5–35.7)
MCV RBC AUTO: 93.3 FL (ref 79–97)
MONOCYTES # BLD: 0.06 10*3/MM3 (ref 0.1–0.9)
NEUTROPHILS # BLD AUTO: 0.52 10*3/MM3 (ref 1.7–7)
NEUTROPHILS NFR BLD MANUAL: 68 % (ref 42.7–76)
PLAT MORPH BLD: NORMAL
PLATELET # BLD AUTO: 256 10*3/MM3 (ref 140–450)
PMV BLD AUTO: 10.5 FL (ref 6–12)
POTASSIUM SERPL-SCNC: 4.1 MMOL/L (ref 3.5–5.2)
PROT SERPL-MCNC: 6.5 G/DL (ref 6–8.5)
RBC # BLD AUTO: 3.86 10*6/MM3 (ref 4.14–5.8)
RBC MORPH BLD: NORMAL
SODIUM SERPL-SCNC: 141 MMOL/L (ref 136–145)
VARIANT LYMPHS NFR BLD MANUAL: 20 % (ref 19.6–45.3)
WBC MORPH BLD: NORMAL
WBC NRBC COR # BLD: 0.76 10*3/MM3 (ref 3.4–10.8)

## 2023-02-09 PROCEDURE — 80053 COMPREHEN METABOLIC PANEL: CPT

## 2023-02-09 PROCEDURE — 85025 COMPLETE CBC W/AUTO DIFF WBC: CPT

## 2023-02-09 PROCEDURE — 71046 X-RAY EXAM CHEST 2 VIEWS: CPT

## 2023-02-09 PROCEDURE — 36415 COLL VENOUS BLD VENIPUNCTURE: CPT

## 2023-02-09 PROCEDURE — 85007 BL SMEAR W/DIFF WBC COUNT: CPT

## 2023-02-17 ENCOUNTER — OFFICE VISIT (OUTPATIENT)
Dept: ONCOLOGY | Facility: CLINIC | Age: 68
End: 2023-02-17
Payer: MEDICARE

## 2023-02-17 VITALS
DIASTOLIC BLOOD PRESSURE: 84 MMHG | RESPIRATION RATE: 16 BRPM | SYSTOLIC BLOOD PRESSURE: 136 MMHG | WEIGHT: 198.7 LBS | BODY MASS INDEX: 28.45 KG/M2 | HEIGHT: 70 IN | HEART RATE: 90 BPM | OXYGEN SATURATION: 99 % | TEMPERATURE: 97.1 F

## 2023-02-17 DIAGNOSIS — I82.462 ACUTE DEEP VEIN THROMBOSIS (DVT) OF CALF MUSCLE VEIN OF LEFT LOWER EXTREMITY: ICD-10-CM

## 2023-02-17 DIAGNOSIS — C91.Z0 T-CELL LARGE GRANULAR LYMPHOCYTIC LEUKEMIA: Primary | ICD-10-CM

## 2023-02-17 PROCEDURE — 99214 OFFICE O/P EST MOD 30 MIN: CPT | Performed by: INTERNAL MEDICINE

## 2023-02-17 RX ORDER — CYCLOPHOSPHAMIDE 50 MG/1
CAPSULE ORAL
COMMUNITY
Start: 2023-02-06

## 2023-02-17 RX ORDER — AZELASTINE 1 MG/ML
2 SPRAY, METERED NASAL 2 TIMES DAILY
COMMUNITY
Start: 2023-02-09

## 2023-02-17 RX ORDER — ONDANSETRON HYDROCHLORIDE 8 MG/1
8 TABLET, FILM COATED ORAL EVERY 8 HOURS PRN
COMMUNITY
Start: 2022-10-27

## 2023-02-17 NOTE — PROGRESS NOTES
Follow Up Office Visit      Date: 2023     Patient Name: Jose Wells  MRN: [id number]  : 1955  Referring Physician: Kiya Grossman     Chief Complaint:  Follow-up for neutropenia     History of Present Illness: Jose Wells is a pleasant 65 y.o. male past medical history of hyperlipidemia and hypertension who presents today for evaluation of neutropenia. The patient is accompanied by their wife who contributes to the history of their care.  Patient states that over the past couple weeks he has been feeling more fatigued.  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  Denies any unexplained fevers, infections, night sweats, weight loss.  He was seen by his PCP who checked a CBC which was notable for WBC of 1.1 with an ANC of 0.6.  Hemoglobin and platelet count were within normal limits.  He denies any family history of any leukemias.  Does note that him and his wife recently traveled to [place].  States that during the trip he was taking excessive dose of a vitamin C multivitamin.  Has continued to take a multivitamin since being home.  Per chart review he had a WBC of 4.8 in 2019 with an ANC of 3400 at that time.  He otherwise feels well today and is compliant with his home medications     Interval History:  Presents to clinic for follow-up.  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   Initially started on methotrexate but this was discontinued in 2022 as he was not having treatment response with his ANC is continuing to remain between 500-1000.  Started on cyclophosphamide around 2022.  Dose increased to 100 mg in 2023.  ANC overall stable today.  Of note, he was diagnosed with a left lower extremity DVT in 2022.  Currently on apixaban for this.  Still having some slight left lower extremity cramping    Oncology History:     Oncology/Hematology History   T-cell large granular lymphocytic leukemia (HCC)   5/17/2022 Initial Diagnosis    T-cell large granular lymphocytic leukemia (HCC)     5/25/2022 - 5/25/2022 Chemotherapy    OP TIXAGEVIMAB / CILGAVIMAB (EVUSHELD) (PHARMACIST USE) (WITHOUT CRITERIA QUESTIONS)     11/29/2022 -  Chemotherapy    OP TIXAGEVIMAB / CILGAVIMAB (EVUSHELD) (PHARMACIST USE) (WITHOUT CRITERIA QUESTIONS)         Subjective      Review of Systems:   Constitutional: Negative for fevers, chills, or weight loss  Eyes: Negative for blurred vision or discharge         Ear/Nose/Throat: Negative for difficulty swallowing, sore throat, LAD                                                       Respiratory: Negative for cough, SOA, wheezing                                                                                        Cardiovascular: Negative for chest pain or palpitations                                                                  Gastrointestinal: Negative for nausea, vomiting or diarrhea                                                                     Genitourinary: Negative for dysuria or hematuria                                                                                           Musculoskeletal: Negative for any joint pains or muscle aches                                                                        Neurologic: Negative for any weakness, headaches, dizziness                                                                         Hematologic: Negative for any easy bleeding or bruising                                                                                   Psychiatric: Negative for anxiety or depression                          Past Medical History/Past Surgical History/ Family History/ Social History: Reviewed by me and unchanged from my previous documentation done on September 2022.     Medications:     Current Outpatient Medications:   •  apixaban (ELIQUIS) 5 MG tablet tablet,  "Take 1 tablet by mouth 2 (Two) Times a Day., Disp: 60 tablet, Rfl: 5  •  ondansetron (ZOFRAN) 8 MG tablet, Take 8 mg by mouth Every 8 (Eight) Hours As Needed., Disp: , Rfl:   •  acyclovir (ZOVIRAX) 800 MG tablet, TAKE 1 TABLET BY MOUTH TWICE A DAY, Disp: 60 tablet, Rfl: 1  •  azelastine (ASTELIN) 0.1 % nasal spray, 2 sprays 2 (Two) Times a Day., Disp: , Rfl:   •  Cyanocobalamin (VITAMIN B 12 PO), Take  by mouth., Disp: , Rfl:   •  cyclophosphamide 50 MG capsule, , Disp: , Rfl:   •  ketoconazole (NIZORAL) 2 % shampoo, SHAMPOO DAILY AS NEEDED, Disp: , Rfl:   •  triamcinolone (KENALOG) 0.1 % ointment, APPLY THIN LAYER TWICE DAILY X 1-2 WEEKS TO AFFECTED AREA. NO MORE THAN 2 WEEKS AT A TIME, Disp: , Rfl:   •  vitamin D (ERGOCALCIFEROL) 1.25 MG (27682 UT) capsule capsule, TAKE 1 CAPSULE BY MOUTH ONE TIME PER WEEK, Disp: 12 capsule, Rfl: 2    Allergies:   Allergies   Allergen Reactions   • Levofloxacin Swelling     Swelling of tendons       Objective     Physical Exam:  Vital Signs:   Vitals:    02/17/23 0915   BP: 136/84   Pulse: 90   Resp: 16   Temp: 97.1 °F (36.2 °C)   TempSrc: Infrared   SpO2: 99%   Weight: 90.1 kg (198 lb 11.2 oz)   Height: 177.8 cm (70\")   PainSc: 0-No pain     Pain Score    02/17/23 0915   PainSc: 0-No pain     ECOG Performance Status: 0 - Asymptomatic    Constitutional: NAD, ECOG 0  Eyes: PERRLA, scleral anicteric  ENT: No LAD, no thyromegaly  Respiratory: CTAB, no wheezing, rales, rhonchi  Cardiovascular: RRR, no murmurs, pulses 2+ bilaterally  Abdomen: soft, NT/ND, no HSM  Musculoskeletal: strength 5/5 bilaterally, no c/c/e  Neurologic: A&O x 3, CN II-XII intact grossly    Results Review:   Lab on 02/09/2023   Component Date Value Ref Range Status   • Glucose 02/09/2023 137 (H)  65 - 99 mg/dL Final   • BUN 02/09/2023 14  8 - 23 mg/dL Final   • Creatinine 02/09/2023 0.93  0.76 - 1.27 mg/dL Final   • Sodium 02/09/2023 141  136 - 145 mmol/L Final   • Potassium 02/09/2023 4.1  3.5 - 5.2 mmol/L Final "   • Chloride 02/09/2023 104  98 - 107 mmol/L Final   • CO2 02/09/2023 29.4 (H)  22.0 - 29.0 mmol/L Final   • Calcium 02/09/2023 9.2  8.6 - 10.5 mg/dL Final   • Total Protein 02/09/2023 6.5  6.0 - 8.5 g/dL Final   • Albumin 02/09/2023 4.1  3.5 - 5.2 g/dL Final   • ALT (SGPT) 02/09/2023 17  1 - 41 U/L Final   • AST (SGOT) 02/09/2023 25  1 - 40 U/L Final   • Alkaline Phosphatase 02/09/2023 73  39 - 117 U/L Final   • Total Bilirubin 02/09/2023 1.1  0.0 - 1.2 mg/dL Final   • Globulin 02/09/2023 2.4  gm/dL Final   • A/G Ratio 02/09/2023 1.7  g/dL Final   • BUN/Creatinine Ratio 02/09/2023 15.1  7.0 - 25.0 Final   • Anion Gap 02/09/2023 7.6  5.0 - 15.0 mmol/L Final   • eGFR 02/09/2023 88.9  >60.0 mL/min/1.73 Final   • WBC 02/09/2023 0.76 (C)  3.40 - 10.80 10*3/mm3 Final   • RBC 02/09/2023 3.86 (L)  4.14 - 5.80 10*6/mm3 Final   • Hemoglobin 02/09/2023 12.8 (L)  13.0 - 17.7 g/dL Final   • Hematocrit 02/09/2023 36.0 (L)  37.5 - 51.0 % Final   • MCV 02/09/2023 93.3  79.0 - 97.0 fL Final   • MCH 02/09/2023 33.2 (H)  26.6 - 33.0 pg Final   • MCHC 02/09/2023 35.6  31.5 - 35.7 g/dL Final   • RDW 02/09/2023 15.5 (H)  12.3 - 15.4 % Final   • RDW-SD 02/09/2023 51.8  37.0 - 54.0 fl Final   • MPV 02/09/2023 10.5  6.0 - 12.0 fL Final   • Platelets 02/09/2023 256  140 - 450 10*3/mm3 Final   • Neutrophil % 02/09/2023 68.0  42.7 - 76.0 % Final   • Lymphocyte % 02/09/2023 20.0  19.6 - 45.3 % Final   • Monocyte % 02/09/2023 8.0  5.0 - 12.0 % Final   • Eosinophil % 02/09/2023 4.0  0.3 - 6.2 % Final   • Neutrophils Absolute 02/09/2023 0.52 (L)  1.70 - 7.00 10*3/mm3 Final   • Lymphocytes Absolute 02/09/2023 0.15 (L)  0.70 - 3.10 10*3/mm3 Final   • Monocytes Absolute 02/09/2023 0.06 (L)  0.10 - 0.90 10*3/mm3 Final   • Eosinophils Absolute 02/09/2023 0.03  0.00 - 0.40 10*3/mm3 Final   • RBC Morphology 02/09/2023 Normal  Normal Final   • WBC Morphology 02/09/2023 Normal  Normal Final   • Platelet Morphology 02/09/2023 Normal  Normal Final       XR  Chest PA & Lateral    Result Date: 2/9/2023  Narrative: XR CHEST PA AND LATERAL Date of Exam: 2/9/2023 1:07 PM EST Indication: R05.3. Comparison: None available. Findings: The heart size is normal. The pulmonary vascular pattern is normal. The right hemidiaphragm is elevated with discoid atelectasis in the right lower lobe.     Impression: Impression: Elevated right hemidiaphragm with discoid atelectasis in the right lower lobe. Electronically Signed: Greg Keita  2/9/2023 1:20 PM EST  Workstation ID: [id number]      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  Per chart review is WBC was 4.8 in April 2019 with an ANC of 3400  -WBC 1.26 in July 2021.  ANC 0.76  -Platelet count and Hgb within normal limits  -LDH within normal limits, peripheral smear showing no immature cells or blast  -Vitamin B12, folate, copper, zinc levels within normal limits.  Vitamin D has previously been deficient  -Status post bone marrow biopsy with aspirate on 7/26/2021  -Pathology nonconcerning for an acute malignancy or MDS.  Most notable for a reactive/regenerative process  -Iron studies within normal limits, hepatitis panel, EBV negative  -Repeat CBC 0.9 and ANC 0.43 in December 2021.  -Repeat bone marrow biopsy in December 2021 without evidence of an acute malignancy  -Patient seen at [facility] where he had a positive TCR gene rearrangement study with next-generation sequencing showing a STAT3 mutation indicative of T-cell-LGL  -Started on weekly methotrexate and tolerating well  -Patient showing no improvement in his labs after about 8-9 months of methotrexate treatment.  Treatment discontinued in August 2022  -Currently on cyclophosphamide 100 mg.  Recent flow cytometry at [facility] without any abnormalities     2.  Vitamin D deficiency  -Noted to be low on lab check in July 2021  -Continue vitamin D  replacement     3.  Vitamin B12 deficiency  -Stable on replacement     4.  Health maintenance  -Status post Evusheld in May 2022  -Continue prophylactic acyclovir    5. Acute deep vein thrombosis (DVT) of calf muscle vein of left lower extremity (HCC)  -Likely provoked event as he had recently had a COVID-19 booster  -Currently on apixaban 5 mg twice daily and tolerating well  -We will check left lower extremity duplex for clot resolution as he has been on treatment for 4 months  -     Duplex Venous Lower Extremity - Left CAR; Future    Other orders  -     apixaban (ELIQUIS) 5 MG tablet tablet; Take 1 tablet by mouth 2 (Two) Times a Day.  Dispense: 60 tablet; Refill: 5         Follow Up:   Follow-up in 3 months     Judah Alcantara MD  Hematology and Oncology     Please note that portions of this note may have been completed with a voice recognition program. Efforts were made to edit the dictations, but occasionally words are mistranscribed.

## 2023-02-24 ENCOUNTER — HOSPITAL ENCOUNTER (OUTPATIENT)
Dept: CARDIOLOGY | Facility: HOSPITAL | Age: 68
Discharge: HOME | End: 2023-02-24
Admitting: INTERNAL MEDICINE
Payer: MEDICARE

## 2023-02-24 ENCOUNTER — TELEPHONE (OUTPATIENT)
Dept: ONCOLOGY | Facility: CLINIC | Age: 68
End: 2023-02-24
Payer: MEDICARE

## 2023-02-24 VITALS — WEIGHT: 198.63 LBS | BODY MASS INDEX: 28.44 KG/M2 | HEIGHT: 70 IN

## 2023-02-24 DIAGNOSIS — I82.462 ACUTE DEEP VEIN THROMBOSIS (DVT) OF CALF MUSCLE VEIN OF LEFT LOWER EXTREMITY: ICD-10-CM

## 2023-02-24 DIAGNOSIS — C91.Z0 T-CELL LARGE GRANULAR LYMPHOCYTIC LEUKEMIA: ICD-10-CM

## 2023-02-24 LAB
BH CV LOWER VASCULAR LEFT COMMON FEMORAL AUGMENT: NORMAL
BH CV LOWER VASCULAR LEFT COMMON FEMORAL COMPETENT: NORMAL
BH CV LOWER VASCULAR LEFT COMMON FEMORAL COMPRESS: NORMAL
BH CV LOWER VASCULAR LEFT COMMON FEMORAL PHASIC: NORMAL
BH CV LOWER VASCULAR LEFT COMMON FEMORAL SPONT: NORMAL
BH CV LOWER VASCULAR LEFT DISTAL FEMORAL AUGMENT: NORMAL
BH CV LOWER VASCULAR LEFT DISTAL FEMORAL COMPETENT: NORMAL
BH CV LOWER VASCULAR LEFT DISTAL FEMORAL COMPRESS: NORMAL
BH CV LOWER VASCULAR LEFT DISTAL FEMORAL PHASIC: NORMAL
BH CV LOWER VASCULAR LEFT DISTAL FEMORAL SPONT: NORMAL
BH CV LOWER VASCULAR LEFT GASTRONEMIUS COMPRESS: NORMAL
BH CV LOWER VASCULAR LEFT GREATER SAPH AK COMPRESS: NORMAL
BH CV LOWER VASCULAR LEFT GREATER SAPH BK COMPRESS: NORMAL
BH CV LOWER VASCULAR LEFT LESSER SAPH COMPRESS: NORMAL
BH CV LOWER VASCULAR LEFT MID FEMORAL AUGMENT: NORMAL
BH CV LOWER VASCULAR LEFT MID FEMORAL COMPETENT: NORMAL
BH CV LOWER VASCULAR LEFT MID FEMORAL COMPRESS: NORMAL
BH CV LOWER VASCULAR LEFT MID FEMORAL PHASIC: NORMAL
BH CV LOWER VASCULAR LEFT MID FEMORAL SPONT: NORMAL
BH CV LOWER VASCULAR LEFT PERONEAL COMPRESS: NORMAL
BH CV LOWER VASCULAR LEFT POPLITEAL AUGMENT: NORMAL
BH CV LOWER VASCULAR LEFT POPLITEAL COMPETENT: NORMAL
BH CV LOWER VASCULAR LEFT POPLITEAL COMPRESS: NORMAL
BH CV LOWER VASCULAR LEFT POPLITEAL PHASIC: NORMAL
BH CV LOWER VASCULAR LEFT POPLITEAL SPONT: NORMAL
BH CV LOWER VASCULAR LEFT POSTERIOR TIBIAL COMPRESS: NORMAL
BH CV LOWER VASCULAR LEFT PROFUNDA FEMORAL PHASIC: NORMAL
BH CV LOWER VASCULAR LEFT PROFUNDA FEMORAL SPONT: NORMAL
BH CV LOWER VASCULAR LEFT PROXIMAL FEMORAL AUGMENT: NORMAL
BH CV LOWER VASCULAR LEFT PROXIMAL FEMORAL COMPETENT: NORMAL
BH CV LOWER VASCULAR LEFT PROXIMAL FEMORAL COMPRESS: NORMAL
BH CV LOWER VASCULAR LEFT PROXIMAL FEMORAL PHASIC: NORMAL
BH CV LOWER VASCULAR LEFT PROXIMAL FEMORAL SPONT: NORMAL
BH CV LOWER VASCULAR LEFT SAPHENOFEMORAL JUNCTION AUGMENT: NORMAL
BH CV LOWER VASCULAR LEFT SAPHENOFEMORAL JUNCTION COMPETENT: NORMAL
BH CV LOWER VASCULAR LEFT SAPHENOFEMORAL JUNCTION COMPRESS: NORMAL
BH CV LOWER VASCULAR LEFT SAPHENOFEMORAL JUNCTION PHASIC: NORMAL
BH CV LOWER VASCULAR LEFT SAPHENOFEMORAL JUNCTION SPONT: NORMAL
BH CV LOWER VASCULAR RIGHT COMMON FEMORAL AUGMENT: NORMAL
BH CV LOWER VASCULAR RIGHT COMMON FEMORAL COMPETENT: NORMAL
BH CV LOWER VASCULAR RIGHT COMMON FEMORAL COMPRESS: NORMAL
BH CV LOWER VASCULAR RIGHT COMMON FEMORAL PHASIC: NORMAL
BH CV LOWER VASCULAR RIGHT COMMON FEMORAL SPONT: NORMAL
MAXIMAL PREDICTED HEART RATE: 151 BPM
STRESS TARGET HR: 128 BPM

## 2023-02-24 PROCEDURE — 93971 EXTREMITY STUDY: CPT | Performed by: INTERNAL MEDICINE

## 2023-02-24 PROCEDURE — 93971 EXTREMITY STUDY: CPT

## 2023-02-24 RX ORDER — ACYCLOVIR 800 MG/1
TABLET ORAL
Qty: 60 TABLET | Refills: 1 | Status: SHIPPED | OUTPATIENT
Start: 2023-02-24 | End: 2023-03-30 | Stop reason: SDUPTHER

## 2023-02-24 NOTE — TELEPHONE ENCOUNTER
Called patient to discuss his lower extremity duplex result.  His duplex shows complete clot resolution.  As he has been adequately treated with anticoagulation for his DVT, he has okay to discontinue apixaban at this time.  Counseled patient on signs and symptoms of recurrent DVT including but not limited to lower extremity cramping, pain, swelling, redness and if he were to experience any of these symptoms to contact the clinic immediately

## 2023-03-09 ENCOUNTER — TRANSCRIBE ORDERS (OUTPATIENT)
Dept: LAB | Facility: HOSPITAL | Age: 68
End: 2023-03-09
Payer: MEDICARE

## 2023-03-09 ENCOUNTER — LAB (OUTPATIENT)
Dept: LAB | Facility: HOSPITAL | Age: 68
End: 2023-03-09
Payer: MEDICARE

## 2023-03-09 DIAGNOSIS — C91.Z0 ALEUKEMIC LYMPHOCYTIC LEUKEMIA: ICD-10-CM

## 2023-03-09 DIAGNOSIS — C91.Z0 ALEUKEMIC LYMPHOCYTIC LEUKEMIA: Primary | ICD-10-CM

## 2023-03-09 LAB
BASOPHILS # BLD AUTO: 0.04 10*3/MM3 (ref 0–0.2)
BASOPHILS NFR BLD AUTO: 1.7 % (ref 0–1.5)
DEPRECATED RDW RBC AUTO: 58.2 FL (ref 37–54)
EOSINOPHIL # BLD AUTO: 0.07 10*3/MM3 (ref 0–0.4)
EOSINOPHIL NFR BLD AUTO: 3 % (ref 0.3–6.2)
ERYTHROCYTE [DISTWIDTH] IN BLOOD BY AUTOMATED COUNT: 16.3 % (ref 12.3–15.4)
HCT VFR BLD AUTO: 37 % (ref 37.5–51)
HGB BLD-MCNC: 12.8 G/DL (ref 13–17.7)
IMM GRANULOCYTES # BLD AUTO: 0.01 10*3/MM3 (ref 0–0.05)
IMM GRANULOCYTES NFR BLD AUTO: 0.4 % (ref 0–0.5)
LYMPHOCYTES # BLD AUTO: 0.21 10*3/MM3 (ref 0.7–3.1)
LYMPHOCYTES NFR BLD AUTO: 8.9 % (ref 19.6–45.3)
MCH RBC QN AUTO: 33.8 PG (ref 26.6–33)
MCHC RBC AUTO-ENTMCNC: 34.6 G/DL (ref 31.5–35.7)
MCV RBC AUTO: 97.6 FL (ref 79–97)
MONOCYTES # BLD AUTO: 0.18 10*3/MM3 (ref 0.1–0.9)
MONOCYTES NFR BLD AUTO: 7.7 % (ref 5–12)
NEUTROPHILS NFR BLD AUTO: 1.84 10*3/MM3 (ref 1.7–7)
NEUTROPHILS NFR BLD AUTO: 78.3 % (ref 42.7–76)
NRBC BLD AUTO-RTO: 0 /100 WBC (ref 0–0.2)
PLATELET # BLD AUTO: 212 10*3/MM3 (ref 140–450)
PMV BLD AUTO: 10.2 FL (ref 6–12)
RBC # BLD AUTO: 3.79 10*6/MM3 (ref 4.14–5.8)
WBC NRBC COR # BLD: 2.35 10*3/MM3 (ref 3.4–10.8)

## 2023-03-09 PROCEDURE — 80053 COMPREHEN METABOLIC PANEL: CPT

## 2023-03-09 PROCEDURE — 36415 COLL VENOUS BLD VENIPUNCTURE: CPT

## 2023-03-09 PROCEDURE — 85025 COMPLETE CBC W/AUTO DIFF WBC: CPT

## 2023-03-10 LAB
ALBUMIN SERPL-MCNC: 4.2 G/DL (ref 3.5–5.2)
ALBUMIN/GLOB SERPL: 1.8 G/DL
ALP SERPL-CCNC: 70 U/L (ref 39–117)
ALT SERPL W P-5'-P-CCNC: 19 U/L (ref 1–41)
ANION GAP SERPL CALCULATED.3IONS-SCNC: 8.2 MMOL/L (ref 5–15)
AST SERPL-CCNC: 28 U/L (ref 1–40)
BILIRUB SERPL-MCNC: 0.9 MG/DL (ref 0–1.2)
BUN SERPL-MCNC: 11 MG/DL (ref 8–23)
BUN/CREAT SERPL: 12.2 (ref 7–25)
CALCIUM SPEC-SCNC: 9.3 MG/DL (ref 8.6–10.5)
CHLORIDE SERPL-SCNC: 104 MMOL/L (ref 98–107)
CO2 SERPL-SCNC: 26.8 MMOL/L (ref 22–29)
CREAT SERPL-MCNC: 0.9 MG/DL (ref 0.76–1.27)
EGFRCR SERPLBLD CKD-EPI 2021: 92.5 ML/MIN/1.73
GLOBULIN UR ELPH-MCNC: 2.3 GM/DL
GLUCOSE SERPL-MCNC: 95 MG/DL (ref 65–99)
POTASSIUM SERPL-SCNC: 4 MMOL/L (ref 3.5–5.2)
PROT SERPL-MCNC: 6.5 G/DL (ref 6–8.5)
SODIUM SERPL-SCNC: 139 MMOL/L (ref 136–145)

## 2023-03-30 DIAGNOSIS — C91.Z0 T-CELL LARGE GRANULAR LYMPHOCYTIC LEUKEMIA: ICD-10-CM

## 2023-03-30 RX ORDER — ACYCLOVIR 800 MG/1
TABLET ORAL
Qty: 60 TABLET | Refills: 1 | Status: SHIPPED | OUTPATIENT
Start: 2023-03-30

## 2023-04-18 ENCOUNTER — TRANSCRIBE ORDERS (OUTPATIENT)
Dept: ADMINISTRATIVE | Facility: HOSPITAL | Age: 68
End: 2023-04-18
Payer: MEDICARE

## 2023-04-18 DIAGNOSIS — I73.9 CLAUDICATION: Primary | ICD-10-CM

## 2023-04-18 DIAGNOSIS — M54.50 LOW BACK PAIN, EPISODIC: ICD-10-CM

## 2023-04-19 ENCOUNTER — TRANSCRIBE ORDERS (OUTPATIENT)
Dept: ADMINISTRATIVE | Facility: HOSPITAL | Age: 68
End: 2023-04-19
Payer: MEDICARE

## 2023-04-19 DIAGNOSIS — I73.9 CLAUDICATION: Primary | ICD-10-CM

## 2023-04-20 ENCOUNTER — LAB (OUTPATIENT)
Dept: LAB | Facility: HOSPITAL | Age: 68
End: 2023-04-20
Payer: MEDICARE

## 2023-04-20 ENCOUNTER — TRANSCRIBE ORDERS (OUTPATIENT)
Dept: LAB | Facility: HOSPITAL | Age: 68
End: 2023-04-20
Payer: MEDICARE

## 2023-04-20 DIAGNOSIS — C91.Z0 ALEUKEMIC LYMPHOCYTIC LEUKEMIA: ICD-10-CM

## 2023-04-20 DIAGNOSIS — C91.Z0 ALEUKEMIC LYMPHOCYTIC LEUKEMIA: Primary | ICD-10-CM

## 2023-04-20 LAB
ALBUMIN SERPL-MCNC: 4 G/DL (ref 3.5–5.2)
ALBUMIN/GLOB SERPL: 1.7 G/DL
ALP SERPL-CCNC: 68 U/L (ref 39–117)
ALT SERPL W P-5'-P-CCNC: 28 U/L (ref 1–41)
ANION GAP SERPL CALCULATED.3IONS-SCNC: 8 MMOL/L (ref 5–15)
AST SERPL-CCNC: 32 U/L (ref 1–40)
BASOPHILS # BLD AUTO: 0.03 10*3/MM3 (ref 0–0.2)
BASOPHILS NFR BLD AUTO: 1.3 % (ref 0–1.5)
BILIRUB SERPL-MCNC: 1 MG/DL (ref 0–1.2)
BUN SERPL-MCNC: 11 MG/DL (ref 8–23)
BUN/CREAT SERPL: 12.2 (ref 7–25)
CALCIUM SPEC-SCNC: 9.2 MG/DL (ref 8.6–10.5)
CHLORIDE SERPL-SCNC: 105 MMOL/L (ref 98–107)
CO2 SERPL-SCNC: 27 MMOL/L (ref 22–29)
CREAT SERPL-MCNC: 0.9 MG/DL (ref 0.76–1.27)
DEPRECATED RDW RBC AUTO: 57.5 FL (ref 37–54)
EGFRCR SERPLBLD CKD-EPI 2021: 92.5 ML/MIN/1.73
EOSINOPHIL # BLD AUTO: 0.05 10*3/MM3 (ref 0–0.4)
EOSINOPHIL NFR BLD AUTO: 2.2 % (ref 0.3–6.2)
ERYTHROCYTE [DISTWIDTH] IN BLOOD BY AUTOMATED COUNT: 16 % (ref 12.3–15.4)
GLOBULIN UR ELPH-MCNC: 2.4 GM/DL
GLUCOSE SERPL-MCNC: 97 MG/DL (ref 65–99)
HCT VFR BLD AUTO: 33.6 % (ref 37.5–51)
HGB BLD-MCNC: 12.3 G/DL (ref 13–17.7)
IMM GRANULOCYTES # BLD AUTO: 0.01 10*3/MM3 (ref 0–0.05)
IMM GRANULOCYTES NFR BLD AUTO: 0.4 % (ref 0–0.5)
LYMPHOCYTES # BLD AUTO: 0.22 10*3/MM3 (ref 0.7–3.1)
LYMPHOCYTES NFR BLD AUTO: 9.7 % (ref 19.6–45.3)
MCH RBC QN AUTO: 35.5 PG (ref 26.6–33)
MCHC RBC AUTO-ENTMCNC: 36.6 G/DL (ref 31.5–35.7)
MCV RBC AUTO: 97.1 FL (ref 79–97)
MONOCYTES # BLD AUTO: 0.3 10*3/MM3 (ref 0.1–0.9)
MONOCYTES NFR BLD AUTO: 13.2 % (ref 5–12)
NEUTROPHILS NFR BLD AUTO: 1.66 10*3/MM3 (ref 1.7–7)
NEUTROPHILS NFR BLD AUTO: 73.2 % (ref 42.7–76)
NRBC BLD AUTO-RTO: 0 /100 WBC (ref 0–0.2)
PLATELET # BLD AUTO: 243 10*3/MM3 (ref 140–450)
PMV BLD AUTO: 10.5 FL (ref 6–12)
POTASSIUM SERPL-SCNC: 4.1 MMOL/L (ref 3.5–5.2)
PROT SERPL-MCNC: 6.4 G/DL (ref 6–8.5)
RBC # BLD AUTO: 3.46 10*6/MM3 (ref 4.14–5.8)
SODIUM SERPL-SCNC: 140 MMOL/L (ref 136–145)
WBC NRBC COR # BLD: 2.27 10*3/MM3 (ref 3.4–10.8)

## 2023-04-20 PROCEDURE — 36415 COLL VENOUS BLD VENIPUNCTURE: CPT | Performed by: NURSE PRACTITIONER

## 2023-04-20 PROCEDURE — 80053 COMPREHEN METABOLIC PANEL: CPT | Performed by: NURSE PRACTITIONER

## 2023-04-20 PROCEDURE — 85025 COMPLETE CBC W/AUTO DIFF WBC: CPT

## 2023-05-19 ENCOUNTER — HOSPITAL ENCOUNTER (OUTPATIENT)
Dept: MRI IMAGING | Facility: HOSPITAL | Age: 68
Discharge: HOME | End: 2023-05-19
Payer: MEDICARE

## 2023-05-19 ENCOUNTER — HOSPITAL ENCOUNTER (OUTPATIENT)
Dept: CARDIOLOGY | Facility: HOSPITAL | Age: 68
Discharge: HOME | End: 2023-05-19
Payer: MEDICARE

## 2023-05-19 VITALS — BODY MASS INDEX: 28.35 KG/M2 | HEIGHT: 70 IN | WEIGHT: 198 LBS

## 2023-05-19 DIAGNOSIS — M54.50 LOW BACK PAIN, EPISODIC: ICD-10-CM

## 2023-05-19 DIAGNOSIS — I73.9 CLAUDICATION: ICD-10-CM

## 2023-05-19 LAB
BH CV LOWER ARTERIAL LEFT ABI RATIO: 1.26
BH CV LOWER ARTERIAL LEFT DORSALIS PEDIS SYS MAX: 168
BH CV LOWER ARTERIAL LEFT GREAT TOE SYS MAX: 161
BH CV LOWER ARTERIAL LEFT POST TIBIAL SYS MAX: 161
BH CV LOWER ARTERIAL LEFT TBI RATIO: 1.21
BH CV LOWER ARTERIAL RIGHT ABI RATIO: 1.36
BH CV LOWER ARTERIAL RIGHT DORSALIS PEDIS SYS MAX: 181
BH CV LOWER ARTERIAL RIGHT GREAT TOE SYS MAX: 142
BH CV LOWER ARTERIAL RIGHT POST TIBIAL SYS MAX: 177
BH CV LOWER ARTERIAL RIGHT TBI RATIO: 1.07
MAXIMAL PREDICTED HEART RATE: 151 BPM
STRESS TARGET HR: 128 BPM
UPPER ARTERIAL LEFT ARM BRACHIAL SYS MAX: 125 MMHG
UPPER ARTERIAL RIGHT ARM BRACHIAL SYS MAX: 133 MMHG

## 2023-05-19 PROCEDURE — 72148 MRI LUMBAR SPINE W/O DYE: CPT

## 2023-05-19 PROCEDURE — 93923 UPR/LXTR ART STDY 3+ LVLS: CPT | Performed by: INTERNAL MEDICINE

## 2023-05-19 PROCEDURE — 93923 UPR/LXTR ART STDY 3+ LVLS: CPT

## 2023-07-20 ENCOUNTER — TRANSCRIBE ORDERS (OUTPATIENT)
Dept: ADMINISTRATIVE | Facility: HOSPITAL | Age: 68
End: 2023-07-20
Payer: MEDICARE

## 2023-07-20 DIAGNOSIS — R05.3 CHRONIC COUGH: Primary | ICD-10-CM

## 2023-10-12 NOTE — ADDENDUM NOTE
Addended by: XENIA GONZALES on: 7/26/2021 08:35 AM     Modules accepted: [place]     Progressive weakness in lower limbs over last 3 days with continued urinary incontinence. Last admission 10/08 with MRI and CT didn't reveal acute pathology, only showed broad C6-7 disc bulging w/o any spinal compression, no contrast enchainment. Imaging also showed mediastinal LN. Pt on methylprednisolone 4mg for 6 day (on day 3/6- 3 pills 10/12, 2 pills 10/13, 1 pill 10/14).  - neuro consulted  - PT consulted  - hold steroid course pending neuro recs